# Patient Record
Sex: FEMALE | Race: WHITE | Employment: OTHER | URBAN - METROPOLITAN AREA
[De-identification: names, ages, dates, MRNs, and addresses within clinical notes are randomized per-mention and may not be internally consistent; named-entity substitution may affect disease eponyms.]

---

## 2018-08-01 ENCOUNTER — HOSPITAL ENCOUNTER (OUTPATIENT)
Dept: GENERAL RADIOLOGY | Age: 78
Discharge: HOME | End: 2018-08-03
Payer: MEDICARE

## 2018-08-01 DIAGNOSIS — Z12.31 VISIT FOR SCREENING MAMMOGRAM: ICD-10-CM

## 2018-08-01 PROCEDURE — 77063 BREAST TOMOSYNTHESIS BI: CPT

## 2018-10-09 ENCOUNTER — HOSPITAL ENCOUNTER (OUTPATIENT)
Dept: SLEEP CENTER | Age: 78
Discharge: HOME | End: 2018-10-09
Payer: MEDICARE

## 2018-10-09 PROCEDURE — G0399 HOME SLEEP TEST/TYPE 3 PORTA: HCPCS

## 2018-11-13 ENCOUNTER — HOSPITAL ENCOUNTER (OUTPATIENT)
Dept: SLEEP CENTER | Age: 78
Discharge: HOME | End: 2018-11-13
Payer: MEDICARE

## 2018-11-13 DIAGNOSIS — E13.8 DIABETES MELLITUS OF OTHER TYPE WITH COMPLICATION, UNSPECIFIED WHETHER LONG TERM INSULIN USE: ICD-10-CM

## 2018-11-13 DIAGNOSIS — R06.89 GASPING FOR BREATH: ICD-10-CM

## 2018-11-13 DIAGNOSIS — G47.33 OSA (OBSTRUCTIVE SLEEP APNEA): Primary | ICD-10-CM

## 2018-11-13 DIAGNOSIS — R06.83 SNORES: ICD-10-CM

## 2018-11-13 DIAGNOSIS — G47.9 RESTLESS SLEEPER: ICD-10-CM

## 2018-11-13 DIAGNOSIS — I10 HYPERTENSION, UNSPECIFIED TYPE: ICD-10-CM

## 2018-11-13 PROCEDURE — 95811 POLYSOM 6/>YRS CPAP 4/> PARM: CPT

## 2018-11-14 VITALS
BODY MASS INDEX: 32.43 KG/M2 | HEIGHT: 63 IN | OXYGEN SATURATION: 92 % | HEART RATE: 82 BPM | SYSTOLIC BLOOD PRESSURE: 137 MMHG | WEIGHT: 183 LBS | DIASTOLIC BLOOD PRESSURE: 84 MMHG

## 2019-10-17 ENCOUNTER — HOSPITAL ENCOUNTER (OUTPATIENT)
Dept: GENERAL RADIOLOGY | Age: 79
Discharge: HOME | End: 2019-10-19
Payer: MEDICARE

## 2019-10-17 DIAGNOSIS — Z12.31 VISIT FOR SCREENING MAMMOGRAM: ICD-10-CM

## 2019-10-17 PROCEDURE — 77063 BREAST TOMOSYNTHESIS BI: CPT

## 2020-02-01 PROBLEM — E78.5 HLD (HYPERLIPIDEMIA): Status: ACTIVE | Noted: 2020-02-01

## 2020-02-01 PROBLEM — G47.33 OSA (OBSTRUCTIVE SLEEP APNEA): Status: ACTIVE | Noted: 2020-02-01

## 2020-02-01 PROBLEM — I10 HTN (HYPERTENSION): Status: ACTIVE | Noted: 2020-02-01

## 2020-02-01 PROBLEM — E11.9 TYPE 2 DIABETES MELLITUS WITHOUT COMPLICATION, WITHOUT LONG-TERM CURRENT USE OF INSULIN (HCC): Status: ACTIVE | Noted: 2020-02-01

## 2020-02-03 ENCOUNTER — OFFICE VISIT (OUTPATIENT)
Dept: CARDIOLOGY CLINIC | Age: 80
End: 2020-02-03
Payer: MEDICARE

## 2020-02-03 VITALS
RESPIRATION RATE: 18 BRPM | SYSTOLIC BLOOD PRESSURE: 130 MMHG | WEIGHT: 160 LBS | DIASTOLIC BLOOD PRESSURE: 68 MMHG | HEART RATE: 72 BPM | BODY MASS INDEX: 28.35 KG/M2 | HEIGHT: 63 IN

## 2020-02-03 PROCEDURE — 4040F PNEUMOC VAC/ADMIN/RCVD: CPT | Performed by: INTERNAL MEDICINE

## 2020-02-03 PROCEDURE — G8400 PT W/DXA NO RESULTS DOC: HCPCS | Performed by: INTERNAL MEDICINE

## 2020-02-03 PROCEDURE — G8484 FLU IMMUNIZE NO ADMIN: HCPCS | Performed by: INTERNAL MEDICINE

## 2020-02-03 PROCEDURE — G8417 CALC BMI ABV UP PARAM F/U: HCPCS | Performed by: INTERNAL MEDICINE

## 2020-02-03 PROCEDURE — G8427 DOCREV CUR MEDS BY ELIG CLIN: HCPCS | Performed by: INTERNAL MEDICINE

## 2020-02-03 PROCEDURE — 1036F TOBACCO NON-USER: CPT | Performed by: INTERNAL MEDICINE

## 2020-02-03 PROCEDURE — 1123F ACP DISCUSS/DSCN MKR DOCD: CPT | Performed by: INTERNAL MEDICINE

## 2020-02-03 PROCEDURE — 1090F PRES/ABSN URINE INCON ASSESS: CPT | Performed by: INTERNAL MEDICINE

## 2020-02-03 PROCEDURE — 93000 ELECTROCARDIOGRAM COMPLETE: CPT | Performed by: INTERNAL MEDICINE

## 2020-02-03 PROCEDURE — 99204 OFFICE O/P NEW MOD 45 MIN: CPT | Performed by: INTERNAL MEDICINE

## 2020-02-03 RX ORDER — MELOXICAM 15 MG/1
15 TABLET ORAL DAILY
COMMUNITY
End: 2020-05-28

## 2020-02-03 RX ORDER — AMLODIPINE BESYLATE 5 MG/1
5 TABLET ORAL DAILY
COMMUNITY
Start: 2019-12-09

## 2020-02-03 NOTE — PROGRESS NOTES
Chief Complaint   Patient presents with    Heart Problem       Patient Active Problem List    Diagnosis Date Noted    HTN (hypertension) 02/01/2020    HLD (hyperlipidemia) 02/01/2020    Type 2 diabetes mellitus without complication, without long-term current use of insulin ([facility] [address] 02/01/2020    KOJO (obstructive sleep apnea) 02/01/2020       Current Outpatient Medications   Medication Sig Dispense Refill    amLODIPine (NORVASC) 5 MG tablet 5 mg daily       meloxicam (MOBIC) 15 MG tablet Take 15 mg by mouth daily      metFORMIN (GLUCOPHAGE) 500 MG tablet Take 500 mg by mouth 2 times daily (with meals)      aspirin 81 MG EC tablet Take 81 mg by mouth every other day      losartan (COZAAR) 100 MG tablet Take 100 mg by mouth every evening      atorvastatin (LIPITOR) 20 MG tablet Take 20 mg by mouth every evening      Omega-3 Fatty Acids (FISH OIL) 1000 MG CAPS Take 2,000 mg by mouth daily      vitamin D (CHOLECALCIFEROL) 1000 UNIT TABS tablet Take 1,000 Units by mouth daily      docusate sodium (COLACE, DULCOLAX) 100 MG CAPS Take 100 mg by mouth daily (Patient not taking: Reported on 2/3/2020) 30 capsule 0    docusate sodium (COLACE) 100 MG capsule Take 1 capsule by mouth daily as needed for Constipation (Patient not taking: Reported on 2/3/2020) 20 capsule 0    psyllium (KONSYL) 28.3 % PACK Take 1 packet by mouth nightly       Current Facility-Administered Medications   Medication Dose Route Frequency Provider Last Rate Last Dose    perflutren lipid microspheres (DEFINITY) injection 1.65 mg  1.5 mL Intravenous ONCE PRN Kylah Archer MD            Allergies   Allergen Reactions    Darvon [Propoxyphene]     Prednisone     Strawberry Extract Hives    Tomato Hives       Vitals:    02/03/20 1148   BP: 130/68   Pulse: 72   Resp: 18   Weight: 160 lb (72.6 kg)   Height: 5' 3\" (1.6 m)       Social History     Socioeconomic History    Marital status:      Spouse name: Not on file    Number of children: Not on file    Years of education: Not on file    Highest education level: Not on file   Occupational History    Not on file   Social Needs    Financial resource strain: Not on file    Food insecurity:     Worry: Not on file     Inability: Not on file    Transportation needs:     Medical: Not on file     Non-medical: Not on file   Tobacco Use    Smoking status: Former Smoker     Packs/day: 1.00     Start date: 1957     Last attempt to quit: 1986     Years since quittin.6    Smokeless tobacco: Never Used   Substance and Sexual Activity    Alcohol use: No    Drug use: Not on file    Sexual activity: Not Currently     Partners: Male   Lifestyle    Physical activity:     Days per week: Not on file     Minutes per session: Not on file    Stress: Not on file   Relationships    Social connections:     Talks on phone: Not on file     Gets together: Not on file     Attends Faith service: Not on file     Active member of club or organization: Not on file     Attends meetings of clubs or organizations: Not on file     Relationship status: Not on file    Intimate partner violence:     Fear of current or ex partner: Not on file     Emotionally abused: Not on file     Physically abused: Not on file     Forced sexual activity: Not on file   Other Topics Concern    Not on file   Social History Narrative    Not on file       Family History   Problem Relation Age of Onset    Heart Attack Father     Heart Attack Sister     Heart Attack Sister     Breast Cancer Sister     Alzheimer's Disease Sister     Heart Attack Brother     Heart Attack Brother          SUBJECTIVE: Stu Koroma presents to the office today for consult - dr Helga Ramirez - for -evaluation of TIAs. No cardiac encounters but does have an abnormal ekg brought to her attention year ago.  .  She complains of visual disturbances and denies   chest pain, chest pressure/discomfort, claudication, dyspnea, exertional chest pressure/discomfort, fatigue, irregular heart beat, lower extremity edema, near-syncope, orthopnea, palpitations, paroxysmal nocturnal dyspnea, syncope, tachypnea and or other neuro symptoms  Very active elderly lady. PCP wants to put her on plavix and she wanted a second opinion. .        Review of Systems:   Heart: as above   Lungs: as above   Eyes: denies changes in vision or discharge. Ears: denies changes in hearing or pain. Nose: denies epistaxis or masses   Throat: denies sore throat or trouble swallowing. Neuro: denies numbness, tingling, tremors. Skin: denies rashes or itching. : denies hematuria, dysuria   GI: denies vomiting, diarrhea   Psych: denies mood changed, anxiety, depression. all others negative. Physical Exam   /68   Pulse 72   Resp 18   Ht 5' 3\" (1.6 m)   Wt 160 lb (72.6 kg)   BMI 28.34 kg/m²   Constitutional: Oriented to person, place, and time. Well-developed and well-nourished. No distress. Head: Normocephalic and atraumatic. Eyes: EOM are normal. Pupils are equal, round, and reactive to light. Neck: Normal range of motion. Neck supple. No hepatojugular reflux and no JVD present. Right  Carotid bruit possible. No tracheal deviation present. No thyromegaly present. Cardiovascular: Normal rate, regular rhythm, normal heart sounds and intact distal pulses. Exam reveals no gallop and no friction rub. No murmur heard. Pulmonary/Chest: Effort normal and breath sounds normal. No respiratory distress. No wheezes. No rales. No tenderness. Abdominal: Soft. Bowel sounds are normal. No distension and no mass. No tenderness. No rebound and no guarding. Musculoskeletal: Normal range of motion. No edema and no tenderness. Neurological: Alert and oriented to person, place, and time. Skin: Skin is warm and dry. No rash noted. Not diaphoretic. No erythema. Psychiatric: Normal mood and affect.  Behavior is normal.     EKG:  normal sinus rhythm, septal MI pattern , unchanged from previous tracings. ASSESSMENT AND PLAN:  Patient Active Problem List   Diagnosis    HTN (hypertension)    HLD (hyperlipidemia)    Type 2 diabetes mellitus without complication, without long-term current use of insulin (HCC)    KOJO (obstructive sleep apnea)     Patient with apparent diagnosis of TIAs in past - no documentation of encounters with neurology or imaging studies in Epic  Also has an abnormal ekg suggesting remote septal MI pattern back to 2016  No cardiac symptoms  Will get BCDs and echo but likely will need neurology input.   I told her that technically I am not a \"stroke doctor\"      [facility]

## 2020-02-10 ENCOUNTER — HOSPITAL ENCOUNTER (OUTPATIENT)
Dept: ULTRASOUND IMAGING | Age: 80
Discharge: HOME | End: 2020-02-12
Payer: MEDICARE

## 2020-02-10 PROCEDURE — 93880 EXTRACRANIAL BILAT STUDY: CPT

## 2020-02-12 ENCOUNTER — OFFICE VISIT (OUTPATIENT)
Dept: NEUROLOGY | Age: 80
End: 2020-02-12
Payer: MEDICARE

## 2020-02-12 VITALS
SYSTOLIC BLOOD PRESSURE: 142 MMHG | DIASTOLIC BLOOD PRESSURE: 67 MMHG | TEMPERATURE: 96.8 F | RESPIRATION RATE: 14 BRPM | WEIGHT: 162 LBS | OXYGEN SATURATION: 98 % | HEIGHT: 63 IN | HEART RATE: 63 BPM | BODY MASS INDEX: 28.7 KG/M2

## 2020-02-12 PROCEDURE — 99204 OFFICE O/P NEW MOD 45 MIN: CPT | Performed by: PSYCHIATRY & NEUROLOGY

## 2020-02-12 PROCEDURE — G8484 FLU IMMUNIZE NO ADMIN: HCPCS | Performed by: PSYCHIATRY & NEUROLOGY

## 2020-02-12 PROCEDURE — G8427 DOCREV CUR MEDS BY ELIG CLIN: HCPCS | Performed by: PSYCHIATRY & NEUROLOGY

## 2020-02-12 PROCEDURE — G8417 CALC BMI ABV UP PARAM F/U: HCPCS | Performed by: PSYCHIATRY & NEUROLOGY

## 2020-02-12 PROCEDURE — 1090F PRES/ABSN URINE INCON ASSESS: CPT | Performed by: PSYCHIATRY & NEUROLOGY

## 2020-02-12 ASSESSMENT — ENCOUNTER SYMPTOMS
TROUBLE SWALLOWING: 0
NAUSEA: 0
SHORTNESS OF BREATH: 0
PHOTOPHOBIA: 1
VOMITING: 0

## 2020-02-12 NOTE — PROGRESS NOTES
"Reason For Visit:   Chief Complaint   Patient presents with     Surgical Followup     DOS 10/10/17 S/P Left Partial Knee Replacement        Primary MD: Eve Chahal      Date of surgery: 10/10/17  Type of surgery: Left partial knee repalcement    Smoker: No      Ht 1.702 m (5' 7\")  Wt 76.2 kg (168 lb)  LMP 09/05/2017 (Approximate)  BMI 26.31 kg/m2      Current Outpatient Prescriptions   Medication     hydrOXYzine (VISTARIL) 25 MG capsule     HYDROmorphone (DILAUDID) 2 MG tablet     acetaminophen (TYLENOL) 325 MG tablet     aspirin EC 81 MG EC tablet     senna-docusate (SENOKOT-S;PERICOLACE) 8.6-50 MG per tablet     PARoxetine (PAXIL) 10 MG tablet     omeprazole (PRILOSEC) 20 MG capsule     traZODone (DESYREL) 50 MG tablet     Probiotic Product (PROBIOTIC DAILY PO)     Current Facility-Administered Medications   Medication     triamcinolone acetonide (KENALOG-40) injection 40 mg        No Known Allergies        Lexy Li LPN  " NEUROLOGY NEW PATIENT NOTE     Date: 2/12/2020  Name: Hubert Bynum  MRN: [id number]  Patient's PCP: Gabriel Dowd MD     Dear, Dr. Gabriel Dowd MD    REASON FOR VISIT/CHIEF COMPLAINT: Multiple complaints including stroke, memory loss and vision problems. HISTORY OF PRESENT ILLNESS: Hubert Bynum is a 78 y.o.  female with a past medical history of diabetes, hypertension, hyperlipidemia who is coming in to establish care for multiple complaints. The patient reports that she has had multiple TIAs and she is losing brain functions. Her complaint started in her 46s with the initial episode of left eye vision loss and intermittent left eye blurred vision vision changes which would come and go. The patient reports that about 8 years ago she had an episode where she had vision loss in her eyes and was having difficulty speaking at that time she had a brain MRI which was normal.  She had another similar episode 2 years ago. The most recent episode was about 6 weeks ago where she was unable to read to [facility] when was the doctor's office, her eyes went blurred lasted for 20 minutes resolve on its own. She takes aspirin 81 mg daily and Lipitor 20 mg daily. Her most recent lab work showed LDL of 22 and hemoglobin A1c of 6.1. Last brain MRI was in 2017 which showed small vessel disease  CT scan of the head in 2018 showed no acute abnormality  Most recent carotid ultrasound February 2020 showed no significant stenosis. She does not have a history of atrial fibrillation. She has history of smoking, smoked for about 30 years, and quit in 1980s. He does complain of ongoing problems with memory loss, word finding difficulties reports that she becomes confused. She also reports of episodes of staring off in space.   No other aggravating or relieving factors  No other associated symptoms    I have personally reviewed her paperwork from her primary care physician's office    [facility] HISTORY:   Past Medical History:   Diagnosis Date    Cancer ([facility] [address]     skin    [facility]     Diverticular disease     Hypertension      PAST SURGICAL HISTORY:   Past Surgical History:   Procedure Laterality Date    BREAST SURGERY      Mastecotomy left side    COLONOSCOPY  16    CYSTOSCOPY  16    biopsy with ureteral stents    OTHER SURGICAL HISTORY  5-3-2016    exploratory laparotomy sigmoid resection take down of colovesical fistula     FAMILY MEDICAL HISTORY:   Family History   Problem Relation Age of Onset    Heart Attack Father     Heart Attack Sister     Heart Attack Sister     Breast Cancer Sister     Alzheimer's Disease Sister     Heart Attack Brother     Heart Attack Brother      SOCIAL HISTORY:   Social History     Socioeconomic History    Marital status:      Spouse name: None    Number of children: None    Years of education: None    Highest education level: None   Occupational History    None   Social Needs    Financial resource strain: None    Food insecurity:     Worry: None     Inability: None    Transportation needs:     Medical: None     Non-medical: None   Tobacco Use    Smoking status: Former Smoker     Packs/day: 1.00     Start date: 1957     Last attempt to quit: 1986     Years since quittin.6    Smokeless tobacco: Never Used   Substance and Sexual Activity    Alcohol use: No    Drug use: Never    Sexual activity: Not Currently     Partners: Male   Lifestyle    Physical activity:     Days per week: None     Minutes per session: None    Stress: None   Relationships    Social connections:     Talks on phone: None     Gets together: None     Attends Muslim service: None     Active member of club or organization: None     Attends meetings of clubs or organizations: None     Relationship status: None    Intimate partner violence:     Fear of current or ex partner: None     Emotionally abused: None     Physically abused: Negative for neck pain and neck stiffness. Skin: Negative for rash. Allergic/Immunologic: Negative for food allergies. Neurological: Negative for dizziness, tremors, seizures, syncope, speech difficulty, weakness, light-headedness, numbness and headaches. Hematological: Negative for adenopathy. Psychiatric/Behavioral: Negative for agitation, behavioral problems and sleep disturbance. PHYSICAL EXAM:   BP (!) 142/67   Pulse 63   Temp 96.8 °F (36 °C)   Resp 14   Ht 5' 3\" (1.6 m)   Wt 162 lb (73.5 kg)   SpO2 98%   BMI 28.70 kg/m²   GENERAL APPEARANCE: Alert, well-developed, well-nourished female in no acute distress. HEENT: Normocephalic and atraumatic. PERRL. Oropharynx unremarkable. PULM: Normal respiratory effort. No accessory muscle use. CV: RRR. ABDOMEN: Soft, nontender. EXTREMITIES: No obvious signs of vascular compromise. Pulses present. No cyanosis, clubbing or edema. SKIN: Clear; no rashes, lesions or skin breaks in exposed areas. NEURO:     Neurological examination     MENTAL STATUS: Patient awake and oriented to time, place, and person. Recent/remote memory normal. Attention span/concentration normal. Speech fluent. Good comprehension, naming, and repetition. Fund of knowledge appropriate for patient's level of education. Affect normal.    CRANIAL NERVES:  CN I: Not tested. CN II: Fundoscopic exam not performed. CN III, IV, VI: Pupils equal, round and reactive to light; extra ocular movements full and intact. CN V: Facial sensation normal.  CN VII: No facial asymmetry. CN VIII:  Hearing grossly normal bilaterally. No pathologic nystagmus or skew deviation. CN IX, X: Palate elevates symmetrically. CN XI: Shoulder shrug and chin rotation equal with intact strength. CN XII: Tongue protrusion midline. MOTOR: Normal bulk. Tone normal and symmetric throughout. Strength 5/5 throughout.       ABNORMAL MOVEMENTS/TREMORS: No     REFLEXES: DTRs 2+; normal and symmetric found.    MEDICAL DECISION MAKING  ASSESSMENT/PLAN    Tan was seen today for transient ischemic attack. Diagnoses and all orders for this visit:    TIA (transient ischemic attack)  -     MRI Brain WO Contrast; Future    Memory loss  -     MRI Brain WO Contrast; Future    Vision changes  -     MRI Brain WO Contrast; Future  -     EEG; Future  -     MRI Brain WO Contrast; Future  -     MRA Head WO Contrast; Future  -     EEG; Future    · For her TIA she will continue on aspirin 81 daily and Lipitor 20 daily , most recent LDL: 92, hemoglobin A1c: 6.1. She was evaluated by cardiology and has work-up planned. · Risk factor management including hypertension and diabetes as per primary care physician. · MRI brain to look for any stroke as a cause of her symptoms. · MRA head to look for intracranial arterial stenosis. · She had a carotid ultrasound: No significant stenosis  · Check EEG to look out any seizures as a cause of her episodes. Return in about 8 weeks (around 4/8/2020). Today, I personally spent a great deal of time directly face-to-face minutes in direct face-to-face time with the patient, of which greater than 50% was spent in patient education, counseling,about etiology management diagnosis of TIA, memory loss, stroke, stroke prevention strategies, medication use and treatment and the side effects and coordination of care as described above. Patient's current medication list, allergies, problem list and results of all previously ordered testing  were reviewed at today's visit.       MD LINK [facility] Neurology  [address] [place] [address]

## 2020-02-14 ENCOUNTER — TELEPHONE (OUTPATIENT)
Dept: NEUROLOGY | Age: 80
End: 2020-02-14

## 2020-02-18 ENCOUNTER — HOSPITAL ENCOUNTER (OUTPATIENT)
Dept: MRI IMAGING | Age: 80
Discharge: HOME | End: 2020-02-20
Payer: MEDICARE

## 2020-02-18 PROCEDURE — 70551 MRI BRAIN STEM W/O DYE: CPT

## 2020-02-18 PROCEDURE — 70544 MR ANGIOGRAPHY HEAD W/O DYE: CPT

## 2020-02-19 NOTE — RESULT ENCOUNTER NOTE
Please inform the patient that the brain MRI does not show any acute abnormality.   It shows some age-related changes

## 2020-02-20 ENCOUNTER — TELEPHONE (OUTPATIENT)
Dept: NEUROLOGY | Age: 80
End: 2020-02-20

## 2020-02-20 NOTE — TELEPHONE ENCOUNTER
Spoke with patient and explained that the brain MRI does not show any acute abnormality. It shows some age-related changes.  The MRA of head was normal.

## 2020-02-20 NOTE — TELEPHONE ENCOUNTER
----- Message from Antonio Robledo MD sent at 2/19/2020  4:37 PM EST -----  Please inform the patient that the brain MRI does not show any acute abnormality.   It shows some age-related changes

## 2020-02-21 ENCOUNTER — TELEPHONE (OUTPATIENT)
Dept: CARDIOLOGY | Age: 80
End: 2020-02-21

## 2020-02-21 NOTE — TELEPHONE ENCOUNTER
Left message for patient that echo is 2/25/20 at 11:00.   Electronically signed by Alyssa George on 2/21/2020 at 11:34 AM

## 2020-02-25 ENCOUNTER — HOSPITAL ENCOUNTER (OUTPATIENT)
Dept: CARDIOLOGY | Age: 80
Discharge: HOME | End: 2020-02-25
Payer: MEDICARE

## 2020-02-25 LAB
LV EF: 60 %
LVEF MODALITY: NORMAL

## 2020-02-25 PROCEDURE — 93306 TTE W/DOPPLER COMPLETE: CPT | Performed by: PSYCHIATRY & NEUROLOGY

## 2020-02-25 PROCEDURE — 2580000003 HC RX 258: Performed by: INTERNAL MEDICINE

## 2020-02-25 RX ORDER — SODIUM CHLORIDE 0.9 % (FLUSH) 0.9 %
10 SYRINGE (ML) INJECTION PRN
Status: DISCONTINUED | OUTPATIENT
Start: 2020-02-25 | End: 2020-02-26 | Stop reason: HOSPADM

## 2020-02-25 RX ADMIN — SODIUM CHLORIDE, PRESERVATIVE FREE 10 ML: 5 INJECTION INTRAVENOUS at 11:58

## 2020-02-27 ENCOUNTER — TELEPHONE (OUTPATIENT)
Dept: CARDIOLOGY CLINIC | Age: 80
End: 2020-02-27

## 2020-03-03 ENCOUNTER — HOSPITAL ENCOUNTER (OUTPATIENT)
Dept: NEUROLOGY | Age: 80
Discharge: HOME | End: 2020-03-03
Payer: MEDICARE

## 2020-03-03 PROCEDURE — 95812 EEG 41-60 MINUTES: CPT | Performed by: PSYCHIATRY & NEUROLOGY

## 2020-03-03 PROCEDURE — 95819 EEG AWAKE AND ASLEEP: CPT

## 2020-03-03 NOTE — PROCEDURES
EEG REPORT    NAME: Pranav Lewis  : 1940  MRN: [id number]    DATE of EEG: 3/3/2020    Duration of the EE minutes and 13 seconds    CLINICAL INDICATION: This is a 78 y.o. female is being evaluated for memory loss and vision problems    MEDICATIONS:   Current Outpatient Medications:     amLODIPine (NORVASC) 5 MG tablet, 5 mg daily , Disp: , Rfl:     meloxicam (MOBIC) 15 MG tablet, Take 15 mg by mouth daily, Disp: , Rfl:     metFORMIN (GLUCOPHAGE) 500 MG tablet, Take 500 mg by mouth 2 times daily (with meals), Disp: , Rfl:     docusate sodium (COLACE, DULCOLAX) 100 MG CAPS, Take 100 mg by mouth daily (Patient not taking: Reported on 2/3/2020), Disp: 30 capsule, Rfl: 0    docusate sodium (COLACE) 100 MG capsule, Take 1 capsule by mouth daily as needed for Constipation (Patient not taking: Reported on 2/3/2020), Disp: 20 capsule, Rfl: 0    aspirin 81 MG EC tablet, Take 81 mg by mouth every other day, Disp: , Rfl:     losartan (COZAAR) 100 MG tablet, Take 100 mg by mouth every evening, Disp: , Rfl:     atorvastatin (LIPITOR) 20 MG tablet, Take 20 mg by mouth every evening, Disp: , Rfl:     Omega-3 Fatty Acids (FISH OIL) 1000 MG CAPS, Take 2,000 mg by mouth daily, Disp: , Rfl:     vitamin D (CHOLECALCIFEROL) 1000 UNIT TABS tablet, Take 1,000 Units by mouth daily, Disp: , Rfl:     psyllium (KONSYL) 28.3 % PACK, Take 1 packet by mouth nightly, Disp: , Rfl:     Current Facility-Administered Medications:     perflutren lipid microspheres (DEFINITY) injection 1.65 mg, 1.5 mL, Intravenous, ONCE PRN, Sneha Servin MD     EEG DESCRIPTION:  A 16-channel EEG was performed with electrode placement in 10/20 International System. Longitudinal bipolar and referential montages were utilized in analysis. This is a technically adequate study with minor muscle and motion artifacts.       The dominant posterior background rhythm was a low amplitude, poorly modulated, symmetric and synchronous, reactive, 7 Hz,

## 2020-03-17 ENCOUNTER — TELEPHONE (OUTPATIENT)
Dept: NEUROLOGY | Age: 80
End: 2020-03-17

## 2020-03-17 NOTE — TELEPHONE ENCOUNTER
Spoke with patient and informed her that Dr. Brittaney Fraser stated her EEG showed no seizures. It showed slowing which could be related to age.

## 2020-05-28 ENCOUNTER — OFFICE VISIT (OUTPATIENT)
Dept: NEUROLOGY | Age: 80
End: 2020-05-28
Payer: MEDICARE

## 2020-05-28 VITALS
BODY MASS INDEX: 29.41 KG/M2 | HEART RATE: 79 BPM | SYSTOLIC BLOOD PRESSURE: 150 MMHG | OXYGEN SATURATION: 95 % | WEIGHT: 166 LBS | RESPIRATION RATE: 16 BRPM | HEIGHT: 63 IN | DIASTOLIC BLOOD PRESSURE: 70 MMHG | TEMPERATURE: 97.4 F

## 2020-05-28 PROCEDURE — G8427 DOCREV CUR MEDS BY ELIG CLIN: HCPCS | Performed by: PSYCHIATRY & NEUROLOGY

## 2020-05-28 PROCEDURE — 99213 OFFICE O/P EST LOW 20 MIN: CPT | Performed by: PSYCHIATRY & NEUROLOGY

## 2020-05-28 PROCEDURE — 1090F PRES/ABSN URINE INCON ASSESS: CPT | Performed by: PSYCHIATRY & NEUROLOGY

## 2020-05-28 PROCEDURE — G8417 CALC BMI ABV UP PARAM F/U: HCPCS | Performed by: PSYCHIATRY & NEUROLOGY

## 2020-05-28 RX ORDER — CITALOPRAM 10 MG/1
10 TABLET ORAL DAILY
Qty: 30 TABLET | Refills: 0 | Status: SHIPPED
Start: 2020-05-28 | End: 2020-07-27

## 2020-05-28 ASSESSMENT — ENCOUNTER SYMPTOMS
VOMITING: 0
SHORTNESS OF BREATH: 0
NAUSEA: 0
TROUBLE SWALLOWING: 0
PHOTOPHOBIA: 1

## 2020-05-28 NOTE — PROGRESS NOTES
Attends Taoism service: None     Active member of club or organization: None     Attends meetings of clubs or organizations: None     Relationship status: None    Intimate partner violence     Fear of current or ex partner: None     Emotionally abused: None     Physically abused: None     Forced sexual activity: None   Other Topics Concern    None   Social History Narrative    None     Allergy:   Allergies   Allergen Reactions    Darvon [Propoxyphene]     Prednisone Other (See Comments)     Makes her hyper     Strawberry Extract Hives    Tomato Hives     MEDS:   Current Outpatient Medications:     citalopram (CELEXA) 10 MG tablet, Take 1 tablet by mouth daily, Disp: 30 tablet, Rfl: 0    amLODIPine (NORVASC) 5 MG tablet, 5 mg daily , Disp: , Rfl:     metFORMIN (GLUCOPHAGE) 500 MG tablet, Take 500 mg by mouth 2 times daily (with meals), Disp: , Rfl:     aspirin 81 MG EC tablet, Take 81 mg by mouth every other day, Disp: , Rfl:     losartan (COZAAR) 100 MG tablet, Take 100 mg by mouth every evening, Disp: , Rfl:     atorvastatin (LIPITOR) 20 MG tablet, Take 20 mg by mouth every evening, Disp: , Rfl:     Omega-3 Fatty Acids (FISH OIL) 1000 MG CAPS, Take 2,000 mg by mouth daily, Disp: , Rfl:     vitamin D (CHOLECALCIFEROL) 1000 UNIT TABS tablet, Take 1,000 Units by mouth daily, Disp: , Rfl:     docusate sodium (COLACE, DULCOLAX) 100 MG CAPS, Take 100 mg by mouth daily (Patient not taking: Reported on 2/3/2020), Disp: 30 capsule, Rfl: 0    docusate sodium (COLACE) 100 MG capsule, Take 1 capsule by mouth daily as needed for Constipation (Patient not taking: Reported on 2/3/2020), Disp: 20 capsule, Rfl: 0    psyllium (KONSYL) 28.3 % PACK, Take 1 packet by mouth nightly, Disp: , Rfl:     REVIEW OF SYSTEMS  Review of Systems   Constitutional: Negative for appetite change, fatigue and unexpected weight change. HENT: Negative for drooling, hearing loss, tinnitus and trouble swallowing.     Eyes: Positive for photophobia. Negative for visual disturbance. Respiratory: Negative for shortness of breath. Cardiovascular: Negative for palpitations. Gastrointestinal: Negative for nausea and vomiting. Endocrine: Negative for polyuria. Genitourinary: Negative for flank pain. Musculoskeletal: Negative for neck pain and neck stiffness. Skin: Negative for rash. Allergic/Immunologic: Negative for food allergies. Neurological: Negative for dizziness, tremors, seizures, syncope, speech difficulty, weakness, light-headedness, numbness and headaches. Hematological: Negative for adenopathy. Psychiatric/Behavioral: Negative for agitation, behavioral problems and sleep disturbance. PHYSICAL EXAM:   BP (!) 150/70   Pulse 79   Temp 97.4 °F (36.3 °C) (Oral)   Resp 16   Ht 5' 3\" (1.6 m)   Wt 166 lb (75.3 kg)   SpO2 95%   BMI 29.41 kg/m²   GENERAL APPEARANCE: Alert, well-developed, well-nourished female in no acute distress. HEENT: Normocephalic and atraumatic. PERRL. Oropharynx unremarkable. PULM: Normal respiratory effort. No accessory muscle use. CV: RRR. ABDOMEN: Soft, nontender. EXTREMITIES: No obvious signs of vascular compromise. Pulses present. No cyanosis, clubbing or edema. SKIN: Clear; no rashes, lesions or skin breaks in exposed areas. NEURO:     Neurological examination     MENTAL STATUS: Patient awake and oriented to time, place, and person. Recent/remote memory normal. Attention span/concentration normal. Speech fluent. Good comprehension, naming, and repetition. Fund of knowledge appropriate for patient's level of education. Affect normal.    CRANIAL NERVES:  CN I: Not tested. CN II: Fundoscopic exam not performed. CN III, IV, VI: Pupils equal, round and reactive to light; extra ocular movements full and intact. CN V: Facial sensation normal.  CN VII: No facial asymmetry. CN VIII:  Hearing grossly normal bilaterally. No pathologic nystagmus or skew deviation.   CN IX, X: Palate elevates symmetrically. CN XI: Shoulder shrug and chin rotation equal with intact strength. CN XII: Tongue protrusion midline. MOTOR: Normal bulk. Tone normal and symmetric throughout. Strength 5/5 throughout. ABNORMAL MOVEMENTS/TREMORS: No     REFLEXES: DTRs 2+; normal and symmetric throughout. Plantar response downgoing. SENSATION: Sensation grossly intact to fine touch, pain/temperature, vibration and position. COORDINATION: Finger-to-nose and heel to shin normal for age and symmetric. Finger tapping and alternating movements normal.    STATION: Negative Romberg. GAIT:  Normal heel, toe and tandem; no ataxia. DIAGNOSTIC TESTS:     I have personally reviewed the most recent lab results:    Sodium   Date Value Ref Range Status   06/30/2017 141 132 - 146 mmol/L Final   05/09/2016 137 132 - 146 mmol/L Final   05/08/2016 138 132 - 146 mmol/L Final     Potassium   Date Value Ref Range Status   06/30/2017 4.5 3.5 - 5.0 mmol/L Final   05/09/2016 3.9 3.5 - 5.0 mmol/L Final   05/08/2016 3.5 3.5 - 5.0 mmol/L Final     Chloride   Date Value Ref Range Status   06/30/2017 103 98 - 107 mmol/L Final   05/09/2016 103 98 - 107 mmol/L Final   05/08/2016 105 98 - 107 mmol/L Final     CO2   Date Value Ref Range Status   06/30/2017 26 22 - 29 mmol/L Final   05/09/2016 25 22 - 29 mmol/L Final   05/08/2016 25 22 - 29 mmol/L Final     BUN   Date Value Ref Range Status   06/30/2017 17 8 - 23 mg/dL Final   05/09/2016 9 8 - 23 mg/dL Final   05/08/2016 8 8 - 23 mg/dL Final     CREATININE   Date Value Ref Range Status   06/30/2017 0.7 0.5 - 1.0 mg/dL Final   05/09/2016 0.7 0.5 - 1.0 mg/dL Final   05/08/2016 0.7 0.5 - 1.0 mg/dL Final     GFR Non-   Date Value Ref Range Status   06/30/2017 >60 >=60 mL/min/1.73 Final     Comment:     Chronic Kidney Disease: less than 60 ml/min/1.73 sq.m. Kidney Failure: less than 15 ml/min/1.73 sq.m. Results valid for patients 18 years and older.

## 2020-06-22 ENCOUNTER — TELEPHONE (OUTPATIENT)
Dept: NEUROLOGY | Age: 80
End: 2020-06-22

## 2020-06-24 ENCOUNTER — OFFICE VISIT (OUTPATIENT)
Dept: NEUROLOGY | Age: 80
End: 2020-06-24
Payer: MEDICARE

## 2020-06-24 VITALS
TEMPERATURE: 97.7 F | WEIGHT: 166 LBS | SYSTOLIC BLOOD PRESSURE: 157 MMHG | OXYGEN SATURATION: 96 % | BODY MASS INDEX: 29.41 KG/M2 | HEIGHT: 63 IN | HEART RATE: 72 BPM | DIASTOLIC BLOOD PRESSURE: 72 MMHG | RESPIRATION RATE: 16 BRPM

## 2020-06-24 PROCEDURE — 1123F ACP DISCUSS/DSCN MKR DOCD: CPT | Performed by: PSYCHIATRY & NEUROLOGY

## 2020-06-24 PROCEDURE — G8427 DOCREV CUR MEDS BY ELIG CLIN: HCPCS | Performed by: PSYCHIATRY & NEUROLOGY

## 2020-06-24 PROCEDURE — G8417 CALC BMI ABV UP PARAM F/U: HCPCS | Performed by: PSYCHIATRY & NEUROLOGY

## 2020-06-24 PROCEDURE — G8400 PT W/DXA NO RESULTS DOC: HCPCS | Performed by: PSYCHIATRY & NEUROLOGY

## 2020-06-24 PROCEDURE — 99214 OFFICE O/P EST MOD 30 MIN: CPT | Performed by: PSYCHIATRY & NEUROLOGY

## 2020-06-24 PROCEDURE — 1036F TOBACCO NON-USER: CPT | Performed by: PSYCHIATRY & NEUROLOGY

## 2020-06-24 PROCEDURE — 4040F PNEUMOC VAC/ADMIN/RCVD: CPT | Performed by: PSYCHIATRY & NEUROLOGY

## 2020-06-24 PROCEDURE — 1090F PRES/ABSN URINE INCON ASSESS: CPT | Performed by: PSYCHIATRY & NEUROLOGY

## 2020-06-24 RX ORDER — CITALOPRAM 20 MG/1
20 TABLET ORAL DAILY
Qty: 30 TABLET | Refills: 0 | Status: SHIPPED
Start: 2020-06-24 | End: 2020-07-27 | Stop reason: SDUPTHER

## 2020-06-24 RX ORDER — CROMOLYN SODIUM 5.2 MG
AEROSOL, SPRAY WITH PUMP (ML) NASAL
COMMUNITY
Start: 2020-06-03 | End: 2022-09-06

## 2020-06-24 ASSESSMENT — ENCOUNTER SYMPTOMS
PHOTOPHOBIA: 1
NAUSEA: 0
SHORTNESS OF BREATH: 0
VOMITING: 0
TROUBLE SWALLOWING: 0

## 2020-06-24 NOTE — PROGRESS NOTES
years, and quit in . Ongoing problems with memory loss, word finding difficulties reports that she becomes confused. She also reports of episodes of staring off in space.     PAST MEDICAL HISTORY:   Past Medical History:   Diagnosis Date    Cancer ([facility]     skin    Diabetes mellitus ([facility]     Diverticular disease     Hypertension      PAST SURGICAL HISTORY:   Past Surgical History:   Procedure Laterality Date    BREAST SURGERY      Mastecotomy left side    COLONOSCOPY  16    CYSTOSCOPY  16    biopsy with ureteral stents    OTHER SURGICAL HISTORY  5-3-2016    exploratory laparotomy sigmoid resection take down of colovesical fistula     FAMILY MEDICAL HISTORY:   Family History   Problem Relation Age of Onset    Heart Attack Father     Heart Attack Sister     Heart Attack Sister     Breast Cancer Sister     Alzheimer's Disease Sister     Heart Attack Brother     Heart Attack Brother      SOCIAL HISTORY:   Social History     Socioeconomic History    Marital status:      Spouse name: None    Number of children: None    Years of education: None    Highest education level: None   Occupational History    None   Social Needs    Financial resource strain: None    Food insecurity     Worry: None     Inability: None    Transportation needs     Medical: None     Non-medical: None   Tobacco Use    Smoking status: Former Smoker     Packs/day: 1.00     Start date: 1957     Last attempt to quit: 1986     Years since quittin.0    Smokeless tobacco: Never Used   Substance and Sexual Activity    Alcohol use: No    Drug use: Never    Sexual activity: Not Currently     Partners: Male   Lifestyle    Physical activity     Days per week: None     Minutes per session: None    Stress: None   Relationships    Social connections     Talks on phone: None     Gets together: None     Attends Gnosticist service: None     Active member of club or organization: None     Attends asymmetry. CN VIII:  Hearing grossly normal bilaterally. No pathologic nystagmus or skew deviation. CN IX, X: Palate elevates symmetrically. CN XI: Shoulder shrug and chin rotation equal with intact strength. CN XII: Tongue protrusion midline. MOTOR: Normal bulk. Tone normal and symmetric throughout. Strength 5/5 throughout. ABNORMAL MOVEMENTS/TREMORS: No     REFLEXES: DTRs 2+; normal and symmetric throughout. Plantar response downgoing. SENSATION: Sensation grossly intact to fine touch, pain/temperature, vibration and position. COORDINATION: Finger-to-nose and heel to shin normal for age and symmetric. Finger tapping and alternating movements normal.    STATION: Negative Romberg. GAIT:  Normal heel, toe and tandem; no ataxia. DIAGNOSTIC TESTS:     I have personally reviewed the most recent lab results:    Sodium   Date Value Ref Range Status   06/30/2017 141 132 - 146 mmol/L Final   05/09/2016 137 132 - 146 mmol/L Final   05/08/2016 138 132 - 146 mmol/L Final     Potassium   Date Value Ref Range Status   06/30/2017 4.5 3.5 - 5.0 mmol/L Final   05/09/2016 3.9 3.5 - 5.0 mmol/L Final   05/08/2016 3.5 3.5 - 5.0 mmol/L Final     Chloride   Date Value Ref Range Status   06/30/2017 103 98 - 107 mmol/L Final   05/09/2016 103 98 - 107 mmol/L Final   05/08/2016 105 98 - 107 mmol/L Final     CO2   Date Value Ref Range Status   06/30/2017 26 22 - 29 mmol/L Final   05/09/2016 25 22 - 29 mmol/L Final   05/08/2016 25 22 - 29 mmol/L Final     BUN   Date Value Ref Range Status   06/30/2017 17 8 - 23 mg/dL Final   05/09/2016 9 8 - 23 mg/dL Final   05/08/2016 8 8 - 23 mg/dL Final     CREATININE   Date Value Ref Range Status   06/30/2017 0.7 0.5 - 1.0 mg/dL Final   05/09/2016 0.7 0.5 - 1.0 mg/dL Final   05/08/2016 0.7 0.5 - 1.0 mg/dL Final     GFR Non-   Date Value Ref Range Status   06/30/2017 >60 >=60 mL/min/1.73 Final     Comment:     Chronic Kidney Disease: less than 60 ml/min/1.73 sq.m.

## 2020-07-09 ENCOUNTER — TELEPHONE (OUTPATIENT)
Dept: NEUROLOGY | Age: 80
End: 2020-07-09

## 2020-07-09 LAB
TSH SERPL DL<=0.05 MIU/L-ACNC: 3.43 UIU/ML
VITAMIN B-12: 151

## 2020-07-09 NOTE — TELEPHONE ENCOUNTER
----- Message from London Ulrich MD sent at 7/9/2020 12:40 PM EDT -----  Please inform the patient that her vitamin B12 levels are low. She should follow-up with her primary care physician to get vitamin B12 shots. In the meantime she should start over the counter vitamin B12.   TSH levels are normal.

## 2020-07-09 NOTE — RESULT ENCOUNTER NOTE
Please inform the patient that her vitamin B12 levels are low. She should follow-up with her primary care physician to get vitamin B12 shots. In the meantime she should start over the counter vitamin B12.   TSH levels are normal.

## 2020-07-27 RX ORDER — CITALOPRAM 20 MG/1
20 TABLET ORAL DAILY
Qty: 30 TABLET | Refills: 0 | Status: SHIPPED
Start: 2020-07-27 | End: 2020-08-21 | Stop reason: SDUPTHER

## 2020-07-27 RX ORDER — CITALOPRAM 20 MG/1
20 TABLET ORAL DAILY
Qty: 30 TABLET | Refills: 0 | OUTPATIENT
Start: 2020-07-27

## 2020-08-21 RX ORDER — CITALOPRAM 20 MG/1
20 TABLET ORAL DAILY
Qty: 30 TABLET | Refills: 2 | Status: SHIPPED
Start: 2020-08-27 | End: 2020-11-10 | Stop reason: SDUPTHER

## 2020-09-02 ENCOUNTER — OFFICE VISIT (OUTPATIENT)
Dept: NEUROLOGY | Age: 80
End: 2020-09-02
Payer: MEDICARE

## 2020-09-02 VITALS
OXYGEN SATURATION: 96 % | WEIGHT: 160 LBS | DIASTOLIC BLOOD PRESSURE: 66 MMHG | HEART RATE: 60 BPM | BODY MASS INDEX: 28.35 KG/M2 | SYSTOLIC BLOOD PRESSURE: 130 MMHG | RESPIRATION RATE: 16 BRPM | TEMPERATURE: 97.3 F | HEIGHT: 63 IN

## 2020-09-02 PROCEDURE — 4040F PNEUMOC VAC/ADMIN/RCVD: CPT | Performed by: PSYCHIATRY & NEUROLOGY

## 2020-09-02 PROCEDURE — 99214 OFFICE O/P EST MOD 30 MIN: CPT | Performed by: PSYCHIATRY & NEUROLOGY

## 2020-09-02 PROCEDURE — G8417 CALC BMI ABV UP PARAM F/U: HCPCS | Performed by: PSYCHIATRY & NEUROLOGY

## 2020-09-02 PROCEDURE — 1090F PRES/ABSN URINE INCON ASSESS: CPT | Performed by: PSYCHIATRY & NEUROLOGY

## 2020-09-02 PROCEDURE — 1036F TOBACCO NON-USER: CPT | Performed by: PSYCHIATRY & NEUROLOGY

## 2020-09-02 PROCEDURE — G8400 PT W/DXA NO RESULTS DOC: HCPCS | Performed by: PSYCHIATRY & NEUROLOGY

## 2020-09-02 PROCEDURE — G8427 DOCREV CUR MEDS BY ELIG CLIN: HCPCS | Performed by: PSYCHIATRY & NEUROLOGY

## 2020-09-02 PROCEDURE — 1123F ACP DISCUSS/DSCN MKR DOCD: CPT | Performed by: PSYCHIATRY & NEUROLOGY

## 2020-09-02 ASSESSMENT — ENCOUNTER SYMPTOMS
TROUBLE SWALLOWING: 0
SHORTNESS OF BREATH: 0
PHOTOPHOBIA: 0
NAUSEA: 0
VOMITING: 0

## 2020-09-02 NOTE — PROGRESS NOTES
NEUROLOGY FOLLOW UP  NOTE     Date: 9/2/2020  Name: Delfina Garcia  MRN: [id number]  Patient's PCP: Phylicia Ray MD       REASON FOR VISIT/CHIEF COMPLAINT: Episodes of blurred vision. Interval history:     The patient is coming in for follow-up visit. Further episodes of vision loss  Currently on aspirin and Lipitor. On Celexa for anxiety and depression. Reports that it is working very well. A feeling of hopelessness. He currently is trying to find a hobby. Currently enjoys doing her daily chores. MRI brain and EEG and MRA head: No acute abnormality  Vitamin B12: Low, currently on vitamin B12 supplementation. Intermittent confusion has been resolved, word finding difficulties during conversation has improved. No other aggravating or relieving factors, no other associated symptoms. No further episodes of staring off in space. No other associated symptoms    MRI brain, MRA head: No acute abnormality  EEG: Normal        I have personally reviewed her MRI images     I have personally reviewed her EEG waveforms. Disease course     Delfina Garcia is a 78 y.o.  female with a past medical history of diabetes, hypertension, hyperlipidemia     Her complaint started in her 46s with the initial episode of left eye vision loss and intermittent left eye blurred vision vision changes which would come and go. The patient reports that about 8 years ago she had an episode where she had vision loss in her eyes and was having difficulty speaking at that time she had a brain MRI which was normal.  She had another similar episode 2 years ago. The most recent episode was about 3 to 6 months ago where she was unable to read to [facility] when was the doctor's office, her eyes went blurred lasted for 20 minutes resolve on its own. She takes aspirin 81 mg daily and Lipitor 20 mg daily. Her most recent lab work showed LDL of 22 and hemoglobin A1c of 6.1.   Most recent carotid ultrasound February 2020 showed shortness of breath. Cardiovascular: Negative for palpitations. Gastrointestinal: Negative for nausea and vomiting. Endocrine: Negative for polyuria. Genitourinary: Negative for flank pain. Musculoskeletal: Negative for neck pain and neck stiffness. Skin: Negative for rash. Allergic/Immunologic: Negative for food allergies. Neurological: Negative for dizziness, tremors, seizures, syncope, speech difficulty, weakness, light-headedness, numbness and headaches. Hematological: Negative for adenopathy. Psychiatric/Behavioral: Negative for agitation, behavioral problems and sleep disturbance. PHYSICAL EXAM:   /66   Pulse 60   Temp 97.3 °F (36.3 °C) (Temporal)   Resp 16   Ht 5' 3\" (1.6 m)   Wt 160 lb (72.6 kg)   SpO2 96%   BMI 28.34 kg/m²   GENERAL APPEARANCE: Alert, well-developed, well-nourished female in no acute distress. HEENT: Normocephalic and atraumatic. PERRL. Oropharynx unremarkable. PULM: Normal respiratory effort. No accessory muscle use. CV: RRR. ABDOMEN: Soft, nontender. EXTREMITIES: No obvious signs of vascular compromise. Pulses present. No cyanosis, clubbing or edema. SKIN: Clear; no rashes, lesions or skin breaks in exposed areas. NEURO:     Neurological examination     MENTAL STATUS: Patient awake and oriented to time, place, and person. Recent/remote memory normal. Attention span/concentration normal. Speech fluent. Good comprehension, naming, and repetition. Fund of knowledge appropriate for patient's level of education. Affect normal.    CRANIAL NERVES:  CN I: Not tested. CN II: Fundoscopic exam not performed. CN III, IV, VI: Pupils equal, round and reactive to light; extra ocular movements full and intact. CN V: Facial sensation normal.  CN VII: No facial asymmetry. CN VIII:  Hearing grossly normal bilaterally. No pathologic nystagmus or skew deviation. CN IX, X: Palate elevates symmetrically.   CN XI: Shoulder shrug and chin rotation equal with intact strength. CN XII: Tongue protrusion midline. MOTOR: Normal bulk. Tone normal and symmetric throughout. Strength 5/5 throughout. ABNORMAL MOVEMENTS/TREMORS: No     REFLEXES: DTRs 2+; normal and symmetric throughout. Plantar response downgoing. SENSATION: Sensation grossly intact to fine touch, pain/temperature, vibration and position. COORDINATION: Finger-to-nose and heel to shin normal for age and symmetric. Finger tapping and alternating movements normal.    STATION: Negative Romberg. GAIT:  Normal heel, toe and tandem; no ataxia. DIAGNOSTIC TESTS:     I have personally reviewed the most recent lab results:    Sodium   Date Value Ref Range Status   06/30/2017 141 132 - 146 mmol/L Final   05/09/2016 137 132 - 146 mmol/L Final   05/08/2016 138 132 - 146 mmol/L Final     Potassium   Date Value Ref Range Status   06/30/2017 4.5 3.5 - 5.0 mmol/L Final   05/09/2016 3.9 3.5 - 5.0 mmol/L Final   05/08/2016 3.5 3.5 - 5.0 mmol/L Final     Chloride   Date Value Ref Range Status   06/30/2017 103 98 - 107 mmol/L Final   05/09/2016 103 98 - 107 mmol/L Final   05/08/2016 105 98 - 107 mmol/L Final     CO2   Date Value Ref Range Status   06/30/2017 26 22 - 29 mmol/L Final   05/09/2016 25 22 - 29 mmol/L Final   05/08/2016 25 22 - 29 mmol/L Final     BUN   Date Value Ref Range Status   06/30/2017 17 8 - 23 mg/dL Final   05/09/2016 9 8 - 23 mg/dL Final   05/08/2016 8 8 - 23 mg/dL Final     CREATININE   Date Value Ref Range Status   06/30/2017 0.7 0.5 - 1.0 mg/dL Final   05/09/2016 0.7 0.5 - 1.0 mg/dL Final   05/08/2016 0.7 0.5 - 1.0 mg/dL Final     GFR Non-   Date Value Ref Range Status   06/30/2017 >60 >=60 mL/min/1.73 Final     Comment:     Chronic Kidney Disease: less than 60 ml/min/1.73 sq.m. Kidney Failure: less than 15 ml/min/1.73 sq.m. Results valid for patients 18 years and older.      05/09/2016 >60 >=60 mL/min/1.73 Final     Comment:     Chronic Kidney Disease: less No results found for: CHOLTOT, TRIG, HDL  No components found for: HGBA1C  No results found for: PROTEINCSF, GLUCCSF, WBCCSF    Controlled Substance Monitoring:    Acute and Chronic Pain Monitoring:   No flowsheet data found. MEDICAL DECISION MAKING  ASSESSMENT/PLAN    Tan was seen today for transient ischemic attack. Diagnoses and all orders for this visit:    Episodes of blurred vision    Anxiety    Memory loss    TIA    B12 deficiency    · For her TIA she will continue on aspirin 81 daily and Lipitor 20 daily   · MRI brain, MRA head: No acute abnormality  · Carotid ultrasound: No acute abnormality  · EEG: Mild generalized slowing  · Etiology: Vision changes could be due to underlying anxiety versus TIA   · Anxiety and depression has well controlled, continue on Celexa 20 mg daily. · Continue with vitamin B-12 supplementation. · Memory has significantly improved after initiating B12. Follow-up with primary care physician    Today, I personally spent a great deal of time directly face-to-face minutes in direct face-to-face time with the patient, of which greater than 50% was spent in patient education, counseling,about etiology management diagnosis of TIA, memory loss, stroke, stroke prevention strategies, ocular migraine, anxiety medication use and treatment and the side effects of Celexa and coordination of care as described above. Patient's current medication list, allergies, problem list and results of all previously ordered testing  were reviewed at today's visit.       MD LINK [facility] - [facility] Neurology  [address] [facility]

## 2020-11-10 RX ORDER — CITALOPRAM 20 MG/1
20 TABLET ORAL DAILY
Qty: 30 TABLET | Refills: 2 | Status: SHIPPED
Start: 2020-11-10 | End: 2021-02-10 | Stop reason: SDUPTHER

## 2021-01-26 ENCOUNTER — HOSPITAL ENCOUNTER (OUTPATIENT)
Dept: GENERAL RADIOLOGY | Age: 81
Discharge: HOME | End: 2021-01-28
Payer: MEDICARE

## 2021-01-26 ENCOUNTER — HOSPITAL ENCOUNTER (OUTPATIENT)
Age: 81
Discharge: HOME | End: 2021-01-28
Payer: MEDICARE

## 2021-01-26 DIAGNOSIS — R06.02 SHORTNESS OF BREATH: ICD-10-CM

## 2021-01-26 PROCEDURE — 71046 X-RAY EXAM CHEST 2 VIEWS: CPT

## 2021-02-10 RX ORDER — CITALOPRAM 20 MG/1
20 TABLET ORAL DAILY
Qty: 30 TABLET | Refills: 2 | Status: SHIPPED
Start: 2021-02-10 | End: 2021-05-18 | Stop reason: SDUPTHER

## 2021-05-18 RX ORDER — CITALOPRAM 20 MG/1
20 TABLET ORAL DAILY
Qty: 30 TABLET | Refills: 2 | Status: SHIPPED
Start: 2021-05-18 | End: 2021-08-09 | Stop reason: SDUPTHER

## 2021-08-09 RX ORDER — CITALOPRAM 20 MG/1
20 TABLET ORAL DAILY
Qty: 30 TABLET | Refills: 2 | Status: SHIPPED
Start: 2021-08-09 | Stop reason: SDUPTHER

## 2021-11-03 RX ORDER — CITALOPRAM 20 MG/1
20 TABLET ORAL DAILY
Qty: 30 TABLET | Refills: 2 | Status: SHIPPED
Start: 2021-11-03 | End: 2022-01-18 | Stop reason: SDUPTHER

## 2022-01-18 RX ORDER — CITALOPRAM 20 MG/1
20 TABLET ORAL DAILY
Qty: 30 TABLET | Refills: 2 | Status: SHIPPED | OUTPATIENT
Start: 2022-01-18 | End: 2022-04-21 | Stop reason: SDUPTHER

## 2022-04-21 ENCOUNTER — OFFICE VISIT (OUTPATIENT)
Dept: NEUROLOGY | Age: 82
End: 2022-04-21
Payer: MEDICARE

## 2022-04-21 VITALS
BODY MASS INDEX: 30.12 KG/M2 | DIASTOLIC BLOOD PRESSURE: 65 MMHG | SYSTOLIC BLOOD PRESSURE: 150 MMHG | HEIGHT: 63 IN | WEIGHT: 170 LBS | HEART RATE: 68 BPM | TEMPERATURE: 97.3 F | OXYGEN SATURATION: 94 %

## 2022-04-21 DIAGNOSIS — G43.909 MIGRAINE WITHOUT STATUS MIGRAINOSUS, NOT INTRACTABLE, UNSPECIFIED MIGRAINE TYPE: Primary | ICD-10-CM

## 2022-04-21 PROCEDURE — G8417 CALC BMI ABV UP PARAM F/U: HCPCS | Performed by: CLINICAL NURSE SPECIALIST

## 2022-04-21 PROCEDURE — 1090F PRES/ABSN URINE INCON ASSESS: CPT | Performed by: CLINICAL NURSE SPECIALIST

## 2022-04-21 PROCEDURE — 1123F ACP DISCUSS/DSCN MKR DOCD: CPT | Performed by: CLINICAL NURSE SPECIALIST

## 2022-04-21 PROCEDURE — 4040F PNEUMOC VAC/ADMIN/RCVD: CPT | Performed by: CLINICAL NURSE SPECIALIST

## 2022-04-21 PROCEDURE — G8427 DOCREV CUR MEDS BY ELIG CLIN: HCPCS | Performed by: CLINICAL NURSE SPECIALIST

## 2022-04-21 PROCEDURE — G8400 PT W/DXA NO RESULTS DOC: HCPCS | Performed by: CLINICAL NURSE SPECIALIST

## 2022-04-21 PROCEDURE — 99215 OFFICE O/P EST HI 40 MIN: CPT | Performed by: CLINICAL NURSE SPECIALIST

## 2022-04-21 PROCEDURE — 1036F TOBACCO NON-USER: CPT | Performed by: CLINICAL NURSE SPECIALIST

## 2022-04-21 RX ORDER — CITALOPRAM 20 MG/1
20 TABLET ORAL DAILY
Qty: 90 TABLET | Refills: 2 | Status: SHIPPED
Start: 2022-04-21 | End: 2022-09-06 | Stop reason: SDUPTHER

## 2022-04-21 NOTE — PROGRESS NOTES
Sasha Giron is a 78 y.o. right handed female     Patient was referred for abnormal spells in her vision as well as speech and language difficulties with accompanying headache. She previously followed with neurology and obtain MRI of the brain MRAs as well as echo and EEG testing all proved essentially unrevealing. Given that her spells seem to occur with stressful events in her life there was a question of anxiety being the underlying cause and she was started on citalopram.    For a number of years her spells did improve however over the past 6 months she has copious amounts of stressful events in her life including her brother being diagnosed with prostatic cancer her  having a stroke and now her other friend who is considered to her brother to her also being diagnosed with cancer. Over the last 6 months she has had 10 spells all occur very similarly. They start off with flashes in her vision that appear like lightning streaks those last for a few minutes and are accompanied by speech and language difficulties she has no trouble understanding people but she has trouble conveying spoken word. Occasionally she will complain of a headache with the spells and occasionally she will have a headache without any of these spells. She readily admits to a \"dull headache \"today. Growing up she never suffered with headaches until the early 1980s where she would then in her early 45s. She noticed that after change of life she started getting headaches and she started getting these flashes in her vision. Again the used to occur very few and far between however as she has gotten older her spells seem to have increased in frequency. Her brother her sister and her mother all suffer from headaches and migraines.     Again she did have an EEG which was unrevealing    No chest pain or palpitations  No SOB  No vertigo, lightheadedness or loss of consciousness  No falls, tripping or stumbling  No incontinence of bowels or bladder  No itching or bruising appreciated    ROS otherwise negative     Prior to Visit Medications    Medication Sig Taking?  Authorizing Provider   citalopram (CELEXA) 20 MG tablet Take 1 tablet by mouth daily Yes Rolo Navarro MD   amLODIPine (NORVASC) 5 MG tablet 5 mg daily  Yes Historical Provider, MD   metFORMIN (GLUCOPHAGE) 500 MG tablet Take 500 mg by mouth 2 times daily (with meals) Yes Historical Provider, MD   aspirin 81 MG EC tablet Take 81 mg by mouth every other day Yes Historical Provider, MD   losartan (COZAAR) 100 MG tablet Take 100 mg by mouth every evening Yes Historical Provider, MD   atorvastatin (LIPITOR) 20 MG tablet Take 20 mg by mouth every evening Yes Historical Provider, MD   vitamin D (CHOLECALCIFEROL) 1000 UNIT TABS tablet Take 1,000 Units by mouth daily Yes Historical Provider, MD   citalopram (CELEXA) 20 MG tablet Take 1 tablet by mouth daily  Rolo Navarro MD   cromolyn (NASALCROM) 5.2 MG/ACT nasal spray INSTILL 2 SPRAYS INTO EACH NOSTIL TWICE DAILY  Patient not taking: Reported on 4/21/2022  Historical Provider, MD   docusate sodium (COLACE, DULCOLAX) 100 MG CAPS Take 100 mg by mouth daily  Patient not taking: Reported on 2/3/2020  Shahida Black DO   docusate sodium (COLACE) 100 MG capsule Take 1 capsule by mouth daily as needed for Constipation  Patient not taking: Reported on 2/3/2020  Danny Crenshaw MD   Omega-3 Fatty Acids (FISH OIL) 1000 MG CAPS Take 2,000 mg by mouth daily  Patient not taking: Reported on 4/21/2022  Historical Provider, MD   psyllium (KONSYL) 28.3 % PACK Take 1 packet by mouth nightly  Patient not taking: Reported on 4/21/2022  Historical Provider, MD       Allergies as of 04/21/2022 - Fully Reviewed 04/21/2022   Allergen Reaction Noted    Darvon [propoxyphene]  04/29/2016    Prednisone Other (See Comments) 04/29/2016    Strawberry extract Hives 04/29/2016    Tomato Hives 04/29/2016       Objective:     BP (!) 150/65 (Site: Right GFRAA >60 06/30/2017    LABGLOM >60 06/30/2017    GLUCOSE 178 06/30/2017    PROT 8.0 04/29/2016    LABALBU 3.6 04/29/2016    CALCIUM 9.5 06/30/2017    BILITOT 0.2 04/29/2016    ALKPHOS 62 04/29/2016    AST 14 04/29/2016    ALT 12 04/29/2016     B12 -- 151 --- in 2020    MRI brain  No evidence for acute ischemic event. 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 MRA head:   Negative MRA of the [place]. EEG: Normal    Echo with bubble 2020   No significant valvular abnormalities. Normal left ventricle size. Ejection fraction is visually estimated at 60%. Agitated saline injected for shunt evaluation. No evidence of patent foramen ovale. I personally reviewed the patient's lab and imaging studies at this time. Assessment:     Patient with recurrent spells of flashes in her vision mainly occurring in her left eye however has occasionally occurred in the right. The spells are typically followed by language difficulties and occasionally headaches. I suspect migraines and suspect that she began having migraines later in life as opposed to the growing up as the rest of her family did   Her trigger appears to be stressful events in her life    I doubt recurrent TIAs causing the same underlying signs of symptoms  EEG was normal    Does have a history of B12 deficiency    Plan:      At this time we will try her on samples of Nurtec sublingual  She will report back regarding her response    If she does not respond to Nurtec we will consider repeating B12 levels as well as trial of Ubrelvy instead    Report back upon utilization of the [address], APRN - CNS  1:58 PM  4/21/2022

## 2022-07-23 ENCOUNTER — HOSPITAL ENCOUNTER (EMERGENCY)
Age: 82
Discharge: HOME | End: 2022-07-23
Payer: MEDICARE

## 2022-07-23 VITALS
RESPIRATION RATE: 16 BRPM | TEMPERATURE: 97 F | DIASTOLIC BLOOD PRESSURE: 62 MMHG | OXYGEN SATURATION: 96 % | HEART RATE: 65 BPM | SYSTOLIC BLOOD PRESSURE: 160 MMHG

## 2022-07-23 DIAGNOSIS — L08.9 SKIN INFECTION: Primary | ICD-10-CM

## 2022-07-23 PROCEDURE — 99283 EMERGENCY DEPT VISIT LOW MDM: CPT

## 2022-07-23 RX ORDER — CEPHALEXIN 500 MG/1
500 CAPSULE ORAL 3 TIMES DAILY
Qty: 21 CAPSULE | Refills: 0 | Status: SHIPPED | OUTPATIENT
Start: 2022-07-23 | End: 2022-07-30

## 2022-07-23 ASSESSMENT — PAIN - FUNCTIONAL ASSESSMENT: PAIN_FUNCTIONAL_ASSESSMENT: NONE - DENIES PAIN

## 2022-07-23 NOTE — ED PROVIDER NOTES
[facility]    HPI:  7/23/22, Time: 2:05 PM EDT         Arlin Isaacs is a 78 y.o. female presenting to the ED for sore to the medial right lower leg, beginning 2 weeks ago. The complaint has been persistent, moderate in severity, and worsened by nothing. Patient reports she has been using a salve on the wound but still red and slightly painful. She is a diabetic and concerned about possible infection. Afebrile without recent travel or sick contacts. Patient denies all other symptoms at this time. Review of Systems:   A complete review of systems was performed and pertinent positives and negatives are stated within HPI, all other systems reviewed and are negative.          --------------------------------------------- PAST HISTORY ---------------------------------------------  Past Medical History:  has a past medical history of Cancer ([facility] [address] Diverticular disease, and Hypertension. Past Surgical History:  has a past surgical history that includes Breast surgery; Cystoscopy (5/1/16); Colonoscopy (5/2/16); and other surgical history (5-3-2016). Social History:  reports that she quit smoking about 36 years ago. She started smoking about 65 years ago. She smoked an average of 1 pack per day. She has never used smokeless tobacco. She reports that she does not drink alcohol and does not use drugs. Family History: family history includes Alzheimer's Disease in her sister; Breast Cancer in her sister; Heart Attack in her brother, brother, father, sister, and sister. The patients home medications have been reviewed. Allergies: Darvon [propoxyphene], Prednisone, Strawberry extract, and Tomato    -------------------------------------------------- RESULTS -------------------------------------------------  All laboratory and radiology results have been personally reviewed by myself   LABS:  No results found for this visit on 07/23/22.     RADIOLOGY:  Interpreted by Radiologist.  No orders to display       ------------------------- NURSING NOTES AND VITALS REVIEWED ---------------------------   The nursing notes within the ED encounter and vital signs as below have been reviewed. BP (!) 160/62   Pulse 65   Temp 97 °F (36.1 °C) (Infrared)   Resp 16   SpO2 96%   Oxygen Saturation Interpretation: Normal      ---------------------------------------------------PHYSICAL EXAM--------------------------------------      Constitutional/General: Alert and oriented x3, well appearing, non toxic in NAD  Head: Normocephalic and atraumatic  Eyes: PERRL, EOMI  Mouth: Oropharynx clear, handling secretions, no trismus  Neck: Supple, full ROM,   Pulmonary: Lungs clear to auscultation bilaterally, no wheezes, rales, or rhonchi. Not in respiratory distress  Cardiovascular:  Regular rate and rhythm, no murmurs, gallops, or rubs. 2+ distal pulses  Abdomen: Soft, non tender, non distended,   Extremities: Moves all extremities x 4. Warm and well perfused  Skin: warm and dry. Approximately dime-sized wound to the medial right lower leg which is erythematous and slightly tender to palpate. No pretibial edema bilaterally. 2+ dorsalis pedis pulse on the right. Neurologic: GCS 15,  Psych: Normal Affect      ------------------------------ ED COURSE/MEDICAL DECISION MAKING----------------------  Medications - No data to display      ED COURSE:       Medical Decision Making:    Patient is an 78year old female presenting to the ED for skin infection. No s/sx of systemic infection. Will plan on outpatient treatment with antibiotics. Advised to follow up with PCP for recheck and return to the ED with new or worsening symptoms. Patient voiced understanding an dis agreeable to the above treatment plan. Counseling:    The emergency provider has spoken with the patient and discussed todays results, in addition to providing specific details for the plan of care and counseling regarding the diagnosis and

## 2022-09-06 ENCOUNTER — OFFICE VISIT (OUTPATIENT)
Dept: NEUROLOGY | Age: 82
End: 2022-09-06
Payer: MEDICARE

## 2022-09-06 VITALS
BODY MASS INDEX: 30.11 KG/M2 | WEIGHT: 170 LBS | TEMPERATURE: 98 F | HEART RATE: 72 BPM | SYSTOLIC BLOOD PRESSURE: 156 MMHG | OXYGEN SATURATION: 98 % | DIASTOLIC BLOOD PRESSURE: 68 MMHG

## 2022-09-06 DIAGNOSIS — G43.909 MIGRAINE WITHOUT STATUS MIGRAINOSUS, NOT INTRACTABLE, UNSPECIFIED MIGRAINE TYPE: Primary | ICD-10-CM

## 2022-09-06 PROCEDURE — 1036F TOBACCO NON-USER: CPT | Performed by: CLINICAL NURSE SPECIALIST

## 2022-09-06 PROCEDURE — 99214 OFFICE O/P EST MOD 30 MIN: CPT | Performed by: CLINICAL NURSE SPECIALIST

## 2022-09-06 PROCEDURE — 1090F PRES/ABSN URINE INCON ASSESS: CPT | Performed by: CLINICAL NURSE SPECIALIST

## 2022-09-06 PROCEDURE — 1123F ACP DISCUSS/DSCN MKR DOCD: CPT | Performed by: CLINICAL NURSE SPECIALIST

## 2022-09-06 PROCEDURE — G8417 CALC BMI ABV UP PARAM F/U: HCPCS | Performed by: CLINICAL NURSE SPECIALIST

## 2022-09-06 PROCEDURE — G8400 PT W/DXA NO RESULTS DOC: HCPCS | Performed by: CLINICAL NURSE SPECIALIST

## 2022-09-06 PROCEDURE — G8427 DOCREV CUR MEDS BY ELIG CLIN: HCPCS | Performed by: CLINICAL NURSE SPECIALIST

## 2022-09-06 RX ORDER — RIMEGEPANT SULFATE 75 MG/75MG
TABLET, ORALLY DISINTEGRATING ORAL
COMMUNITY

## 2022-09-06 RX ORDER — CITALOPRAM 20 MG/1
20 TABLET ORAL DAILY
Qty: 90 TABLET | Refills: 3 | Status: SHIPPED | OUTPATIENT
Start: 2022-09-06

## 2022-09-06 NOTE — PROGRESS NOTES
John Recinos is a 78 y.o. right handed female     Patient was referred for abnormal spells in her vision as well as speech and language difficulties with accompanying headache. She previously followed with neurology and obtain MRI of the brain MRAs as well as echo and EEG testing all proved essentially unrevealing. Given that her spells seem to occur with stressful events in her life there was a question of anxiety being the underlying cause and she was started on citalopram.    For a number of years her spells did improve however over the past 6 months she has copious amounts of stressful events in her life including her brother being diagnosed with prostatic cancer her  having a stroke and now her other friend who is considered to her brother to her also being diagnosed with cancer. Over the last 6 months she has had 10 spells all occur very similarly. They start off with flashes in her vision that appear like lightning streaks those last for a few minutes and are accompanied by speech and language difficulties she has no trouble understanding people but she has trouble conveying spoken word. Occasionally she will complain of a headache with the spells and occasionally she will have a headache without any of these spells. She responded very well to [facility] samples since last appointment    Growing up she never suffered with headaches until the early 1980s where she would then in her early 45s. She noticed that after change of life she started getting headaches and she started getting these flashes in her vision. Again the used to occur very few and far between however as she has gotten older her spells seem to have increased in frequency. Her brother her sister and her mother all suffer from headaches and migraines.     Again she did have an EEG which was unrevealing    No chest pain or palpitations  No SOB  No vertigo, lightheadedness or loss of consciousness  No falls, tripping or stumbling  No incontinence of bowels or bladder  No itching or bruising appreciated    ROS otherwise negative     Allergies as of 09/06/2022 - Fully Reviewed 09/06/2022   Allergen Reaction Noted    Darvon [propoxyphene]  04/29/2016    Prednisone Other (See Comments) 04/29/2016    Strawberry extract Hives 04/29/2016    Tomato Hives 04/29/2016       Objective:     BP (!) 156/68   Pulse 72   Temp 98 °F (36.7 °C)   Wt 170 lb (77.1 kg)   SpO2 98%   BMI 30.11 kg/m²      General appearance: alert, appears stated age and cooperative  Head: Normocephalic, without obvious abnormality, atraumatic  Extremities: no cyanosis or edema  Pulses: 2+ and symmetric  Skin: no rashes or lesions    Mental Status: Alert, oriented, thought content appropriate    Speech: clear  Language: appropriate    Cranial Nerves:  I: smell    II: visual acuity     II: visual fields Full   II: pupils LEONCIO   III,VII: ptosis None   III,IV,VI: extraocular muscles  EOMI without nystagmus    V: mastication Normal   V: facial light touch sensation  Normal   V,VII: corneal reflex  Present   VII: facial muscle function - upper     VII: facial muscle function - lower Normal   VIII: hearing Normal   IX: soft palate elevation  Normal   IX,X: gag reflex    XI: trapezius strength  5/5   XI: sternocleidomastoid strength 5/5   XI: neck extension strength  5/5   XII: tongue strength  Normal     Motor:  5/5 throughout  Normal bulk and tone    Sensory:  Normal to LT  Vibration normal in the ankles     Coordination:   FN, FFM and JOS symmetrical    Gait:  Normal     DTR:   No reflexes  No Sheriff's     Laboratory/Radiology:     CBC with Differential:    Lab Results   Component Value Date/Time    WBC 8.3 05/09/2016 03:05 AM    RBC 3.51 05/09/2016 03:05 AM    HGB 10.3 05/09/2016 03:05 AM    HCT 31.4 05/09/2016 03:05 AM     05/09/2016 03:05 AM    MCV 89.2 05/09/2016 03:05 AM    MCH 29.2 05/09/2016 03:05 AM    MCHC 32.7 05/09/2016 03:05 AM    RDW 14.4 05/09/2016 03:05 AM    LYMPHOPCT 7 05/04/2016 04:40 AM    MONOPCT 6 05/04/2016 04:40 AM    BASOPCT 0 05/04/2016 04:40 AM    MONOSABS 1.02 05/04/2016 04:40 AM    LYMPHSABS 1.13 05/04/2016 04:40 AM    EOSABS 0.01 05/04/2016 04:40 AM    BASOSABS 0.02 05/04/2016 04:40 AM     CMP:    Lab Results   Component Value Date/Time     06/30/2017 12:05 PM    K 4.5 06/30/2017 12:05 PM     06/30/2017 12:05 PM    CO2 26 06/30/2017 12:05 PM    BUN 17 06/30/2017 12:05 PM    CREATININE 0.7 06/30/2017 12:05 PM    GFRAA >60 06/30/2017 12:05 PM    LABGLOM >60 06/30/2017 12:05 PM    GLUCOSE 178 06/30/2017 12:05 PM    PROT 8.0 04/29/2016 11:00 AM    LABALBU 3.6 04/29/2016 11:00 AM    CALCIUM 9.5 06/30/2017 12:05 PM    BILITOT 0.2 04/29/2016 11:00 AM    ALKPHOS 62 04/29/2016 11:00 AM    AST 14 04/29/2016 11:00 AM    ALT 12 04/29/2016 11:00 AM     B12 -- 151 --- in 2020    MRI brain  No evidence for acute ischemic event. 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 MRA head:   Negative MRA of the [place]. EEG: Normal    Echo with bubble 2020   No significant valvular abnormalities. Normal left ventricle size. Ejection fraction is visually estimated at 60%. Agitated saline injected for shunt evaluation. No evidence of patent foramen ovale. I personally reviewed the patient's lab and imaging studies at this time. Assessment:     Patient with recurrent spells of flashes in her vision mainly occurring in her left eye however has occasionally occurred in the right. The spells are typically followed by language difficulties and occasionally headaches.   I suspect migraines and suspect that she began having migraines later in life as opposed to the growing up as the rest of her family did   Her trigger appears to be stressful events in her life   Responded well to Nurtec    I doubt recurrent TIAs causing the same underlying signs of symptoms  EEG was normal    Does have a history of B12 deficiency    Plan:     Continue Nurtec as needed  Samples once again provided    BRENDON Meeks  4:02 PM  9/6/2022

## 2023-04-20 ENCOUNTER — APPOINTMENT (OUTPATIENT)
Dept: CT IMAGING | Age: 83
End: 2023-04-20
Payer: MEDICARE

## 2023-04-20 ENCOUNTER — APPOINTMENT (OUTPATIENT)
Dept: GENERAL RADIOLOGY | Age: 83
End: 2023-04-20
Payer: MEDICARE

## 2023-04-20 ENCOUNTER — HOSPITAL ENCOUNTER (EMERGENCY)
Age: 83
Discharge: ANOTHER HOSPITAL | End: 2023-04-20
Attending: EMERGENCY MEDICINE
Payer: MEDICARE

## 2023-04-20 ENCOUNTER — HOSPITAL ENCOUNTER (INPATIENT)
Age: 83
LOS: 4 days | Discharge: ANOTHER HOSPITAL | DRG: 305 | End: 2023-04-24
Attending: INTERNAL MEDICINE | Admitting: INTERNAL MEDICINE
Payer: MEDICARE

## 2023-04-20 VITALS
HEART RATE: 61 BPM | OXYGEN SATURATION: 94 % | TEMPERATURE: 97.6 F | DIASTOLIC BLOOD PRESSURE: 58 MMHG | RESPIRATION RATE: 20 BRPM | BODY MASS INDEX: 31.35 KG/M2 | WEIGHT: 177 LBS | SYSTOLIC BLOOD PRESSURE: 167 MMHG

## 2023-04-20 DIAGNOSIS — R42 DIZZINESS: Primary | ICD-10-CM

## 2023-04-20 DIAGNOSIS — I95.1 ORTHOSTATIC HYPOTENSION: ICD-10-CM

## 2023-04-20 DIAGNOSIS — R77.8 ELEVATED TROPONIN: ICD-10-CM

## 2023-04-20 DIAGNOSIS — E04.1 THYROID NODULE: ICD-10-CM

## 2023-04-20 DIAGNOSIS — I65.23 ATHEROSCLEROSIS OF BOTH CAROTID ARTERIES: ICD-10-CM

## 2023-04-20 LAB
ALBUMIN SERPL-MCNC: 4.2 G/DL (ref 3.5–5.2)
ALP SERPL-CCNC: 65 U/L (ref 35–104)
ALT SERPL-CCNC: 10 U/L (ref 0–32)
ANION GAP SERPL CALCULATED.3IONS-SCNC: 11 MMOL/L (ref 7–16)
AST SERPL-CCNC: 15 U/L (ref 0–31)
BACTERIA URNS QL MICRO: NORMAL /HPF
BASOPHILS # BLD: 0.07 E9/L (ref 0–0.2)
BASOPHILS NFR BLD: 1 % (ref 0–2)
BILIRUB SERPL-MCNC: 0.4 MG/DL (ref 0–1.2)
BILIRUB UR QL STRIP: NEGATIVE
BUN SERPL-MCNC: 21 MG/DL (ref 6–23)
CALCIUM SERPL-MCNC: 10 MG/DL (ref 8.6–10.2)
CHLORIDE SERPL-SCNC: 104 MMOL/L (ref 98–107)
CLARITY UR: CLEAR
CO2 SERPL-SCNC: 24 MMOL/L (ref 22–29)
COLOR UR: YELLOW
CREAT SERPL-MCNC: 0.8 MG/DL (ref 0.5–1)
EKG ATRIAL RATE: 59 BPM
EKG P AXIS: 39 DEGREES
EKG P-R INTERVAL: 146 MS
EKG Q-T INTERVAL: 426 MS
EKG QRS DURATION: 124 MS
EKG QTC CALCULATION (BAZETT): 421 MS
EKG R AXIS: 30 DEGREES
EKG T AXIS: 13 DEGREES
EKG VENTRICULAR RATE: 59 BPM
EOSINOPHIL # BLD: 0.24 E9/L (ref 0.05–0.5)
EOSINOPHIL NFR BLD: 3.5 % (ref 0–6)
EPI CELLS #/AREA URNS HPF: NORMAL /HPF
ERYTHROCYTE [DISTWIDTH] IN BLOOD BY AUTOMATED COUNT: 14.6 FL (ref 11.5–15)
GLUCOSE SERPL-MCNC: 109 MG/DL (ref 74–99)
GLUCOSE UR STRIP-MCNC: NEGATIVE MG/DL
HCT VFR BLD AUTO: 42.5 % (ref 34–48)
HGB BLD-MCNC: 13.8 G/DL (ref 11.5–15.5)
HGB UR QL STRIP: NEGATIVE
IMM GRANULOCYTES # BLD: 0.02 E9/L
IMM GRANULOCYTES NFR BLD: 0.3 % (ref 0–5)
KETONES UR STRIP-MCNC: NEGATIVE MG/DL
LEUKOCYTE ESTERASE UR QL STRIP: NEGATIVE
LYMPHOCYTES # BLD: 1.14 E9/L (ref 1.5–4)
LYMPHOCYTES NFR BLD: 16.7 % (ref 20–42)
MCH RBC QN AUTO: 29.6 PG (ref 26–35)
MCHC RBC AUTO-ENTMCNC: 32.5 % (ref 32–34.5)
MCV RBC AUTO: 91 FL (ref 80–99.9)
MONOCYTES # BLD: 0.44 E9/L (ref 0.1–0.95)
MONOCYTES NFR BLD: 6.5 % (ref 2–12)
NEUTROPHILS # BLD: 4.9 E9/L (ref 1.8–7.3)
NEUTS SEG NFR BLD: 72 % (ref 43–80)
NITRITE UR QL STRIP: NEGATIVE
PH UR STRIP: 6 [PH] (ref 5–9)
PLATELET # BLD AUTO: 296 E9/L (ref 130–450)
PMV BLD AUTO: 9.7 FL (ref 7–12)
POTASSIUM SERPL-SCNC: 4.6 MMOL/L (ref 3.5–5)
PROT SERPL-MCNC: 7.2 G/DL (ref 6.4–8.3)
PROT UR STRIP-MCNC: NEGATIVE MG/DL
RBC # BLD AUTO: 4.67 E12/L (ref 3.5–5.5)
RBC #/AREA URNS HPF: NORMAL /HPF (ref 0–2)
SODIUM SERPL-SCNC: 139 MMOL/L (ref 132–146)
SP GR UR STRIP: 1.02 (ref 1–1.03)
TROPONIN, HIGH SENSITIVITY: 66 NG/L (ref 0–9)
TROPONIN, HIGH SENSITIVITY: 67 NG/L (ref 0–9)
UROBILINOGEN UR STRIP-ACNC: 0.2 E.U./DL
WBC # BLD: 6.8 E9/L (ref 4.5–11.5)
WBC #/AREA URNS HPF: NORMAL /HPF (ref 0–5)

## 2023-04-20 PROCEDURE — 2580000003 HC RX 258

## 2023-04-20 PROCEDURE — 1200000000 HC SEMI PRIVATE

## 2023-04-20 PROCEDURE — 2580000003 HC RX 258: Performed by: EMERGENCY MEDICINE

## 2023-04-20 PROCEDURE — 71045 X-RAY EXAM CHEST 1 VIEW: CPT

## 2023-04-20 PROCEDURE — 70496 CT ANGIOGRAPHY HEAD: CPT

## 2023-04-20 PROCEDURE — 84484 ASSAY OF TROPONIN QUANT: CPT

## 2023-04-20 PROCEDURE — 70498 CT ANGIOGRAPHY NECK: CPT

## 2023-04-20 PROCEDURE — 6370000000 HC RX 637 (ALT 250 FOR IP)

## 2023-04-20 PROCEDURE — 36415 COLL VENOUS BLD VENIPUNCTURE: CPT

## 2023-04-20 PROCEDURE — 93010 ELECTROCARDIOGRAM REPORT: CPT | Performed by: INTERNAL MEDICINE

## 2023-04-20 PROCEDURE — 80053 COMPREHEN METABOLIC PANEL: CPT

## 2023-04-20 PROCEDURE — 81001 URINALYSIS AUTO W/SCOPE: CPT

## 2023-04-20 PROCEDURE — 93005 ELECTROCARDIOGRAM TRACING: CPT | Performed by: NURSE PRACTITIONER

## 2023-04-20 PROCEDURE — 99285 EMERGENCY DEPT VISIT HI MDM: CPT

## 2023-04-20 PROCEDURE — 85025 COMPLETE CBC W/AUTO DIFF WBC: CPT

## 2023-04-20 PROCEDURE — 6360000004 HC RX CONTRAST MEDICATION: Performed by: RADIOLOGY

## 2023-04-20 RX ORDER — ENOXAPARIN SODIUM 100 MG/ML
40 INJECTION SUBCUTANEOUS DAILY
Status: DISCONTINUED | OUTPATIENT
Start: 2023-04-21 | End: 2023-04-24 | Stop reason: HOSPADM

## 2023-04-20 RX ORDER — CITALOPRAM 20 MG/1
20 TABLET ORAL DAILY
Status: DISCONTINUED | OUTPATIENT
Start: 2023-04-21 | End: 2023-04-24 | Stop reason: HOSPADM

## 2023-04-20 RX ORDER — ONDANSETRON 4 MG/1
4 TABLET, ORALLY DISINTEGRATING ORAL EVERY 8 HOURS PRN
Status: DISCONTINUED | OUTPATIENT
Start: 2023-04-20 | End: 2023-04-24 | Stop reason: HOSPADM

## 2023-04-20 RX ORDER — SODIUM CHLORIDE 0.9 % (FLUSH) 0.9 %
10 SYRINGE (ML) INJECTION PRN
Status: DISCONTINUED | OUTPATIENT
Start: 2023-04-20 | End: 2023-04-24 | Stop reason: HOSPADM

## 2023-04-20 RX ORDER — POLYETHYLENE GLYCOL 3350 17 G/17G
17 POWDER, FOR SOLUTION ORAL DAILY PRN
Status: DISCONTINUED | OUTPATIENT
Start: 2023-04-20 | End: 2023-04-24 | Stop reason: HOSPADM

## 2023-04-20 RX ORDER — ASPIRIN 81 MG/1
81 TABLET ORAL EVERY OTHER DAY
Status: DISCONTINUED | OUTPATIENT
Start: 2023-04-21 | End: 2023-04-21

## 2023-04-20 RX ORDER — SODIUM CHLORIDE 0.9 % (FLUSH) 0.9 %
5-40 SYRINGE (ML) INJECTION EVERY 12 HOURS SCHEDULED
Status: DISCONTINUED | OUTPATIENT
Start: 2023-04-20 | End: 2023-04-24 | Stop reason: HOSPADM

## 2023-04-20 RX ORDER — ACETAMINOPHEN 325 MG/1
650 TABLET ORAL EVERY 6 HOURS PRN
Status: DISCONTINUED | OUTPATIENT
Start: 2023-04-20 | End: 2023-04-24 | Stop reason: HOSPADM

## 2023-04-20 RX ORDER — HYDRALAZINE HYDROCHLORIDE 20 MG/ML
5 INJECTION INTRAMUSCULAR; INTRAVENOUS EVERY 6 HOURS PRN
Status: DISCONTINUED | OUTPATIENT
Start: 2023-04-20 | End: 2023-04-24 | Stop reason: HOSPADM

## 2023-04-20 RX ORDER — 0.9 % SODIUM CHLORIDE 0.9 %
1000 INTRAVENOUS SOLUTION INTRAVENOUS ONCE
Status: COMPLETED | OUTPATIENT
Start: 2023-04-20 | End: 2023-04-20

## 2023-04-20 RX ORDER — SODIUM CHLORIDE 9 MG/ML
INJECTION, SOLUTION INTRAVENOUS PRN
Status: DISCONTINUED | OUTPATIENT
Start: 2023-04-20 | End: 2023-04-24 | Stop reason: HOSPADM

## 2023-04-20 RX ORDER — VITAMIN B COMPLEX
1000 TABLET ORAL DAILY
Status: DISCONTINUED | OUTPATIENT
Start: 2023-04-21 | End: 2023-04-24 | Stop reason: HOSPADM

## 2023-04-20 RX ORDER — ATORVASTATIN CALCIUM 20 MG/1
20 TABLET, FILM COATED ORAL EVERY EVENING
Status: DISCONTINUED | OUTPATIENT
Start: 2023-04-20 | End: 2023-04-24 | Stop reason: HOSPADM

## 2023-04-20 RX ORDER — LOSARTAN POTASSIUM 50 MG/1
100 TABLET ORAL EVERY EVENING
Status: DISCONTINUED | OUTPATIENT
Start: 2023-04-20 | End: 2023-04-24 | Stop reason: HOSPADM

## 2023-04-20 RX ORDER — AMLODIPINE BESYLATE 2.5 MG/1
2.5 TABLET ORAL DAILY
Status: DISCONTINUED | OUTPATIENT
Start: 2023-04-21 | End: 2023-04-21

## 2023-04-20 RX ORDER — ACETAMINOPHEN 650 MG/1
650 SUPPOSITORY RECTAL EVERY 6 HOURS PRN
Status: DISCONTINUED | OUTPATIENT
Start: 2023-04-20 | End: 2023-04-24 | Stop reason: HOSPADM

## 2023-04-20 RX ORDER — SODIUM CHLORIDE 9 MG/ML
INJECTION, SOLUTION INTRAVENOUS CONTINUOUS
Status: DISCONTINUED | OUTPATIENT
Start: 2023-04-20 | End: 2023-04-23

## 2023-04-20 RX ORDER — ONDANSETRON 2 MG/ML
4 INJECTION INTRAMUSCULAR; INTRAVENOUS EVERY 6 HOURS PRN
Status: DISCONTINUED | OUTPATIENT
Start: 2023-04-20 | End: 2023-04-24 | Stop reason: HOSPADM

## 2023-04-20 RX ADMIN — SODIUM CHLORIDE 1000 ML: 9 INJECTION, SOLUTION INTRAVENOUS at 13:05

## 2023-04-20 RX ADMIN — Medication 10 ML: at 22:15

## 2023-04-20 RX ADMIN — ATORVASTATIN CALCIUM 20 MG: 20 TABLET, FILM COATED ORAL at 22:15

## 2023-04-20 RX ADMIN — SODIUM CHLORIDE: 9 INJECTION, SOLUTION INTRAVENOUS at 22:17

## 2023-04-20 RX ADMIN — IOPAMIDOL 75 ML: 755 INJECTION, SOLUTION INTRAVENOUS at 13:54

## 2023-04-20 RX ADMIN — LOSARTAN POTASSIUM 100 MG: 50 TABLET, FILM COATED ORAL at 22:15

## 2023-04-20 ASSESSMENT — PAIN DESCRIPTION - LOCATION: LOCATION: EAR

## 2023-04-20 ASSESSMENT — PAIN SCALES - GENERAL
PAINLEVEL_OUTOF10: 0
PAINLEVEL_OUTOF10: 1

## 2023-04-20 ASSESSMENT — LIFESTYLE VARIABLES: HOW OFTEN DO YOU HAVE A DRINK CONTAINING ALCOHOL: NEVER

## 2023-04-20 ASSESSMENT — PAIN DESCRIPTION - ORIENTATION: ORIENTATION: RIGHT

## 2023-04-20 ASSESSMENT — PAIN DESCRIPTION - DESCRIPTORS: DESCRIPTORS: SHARP;PRESSURE

## 2023-04-20 NOTE — ED PROVIDER NOTES
Procedure Laterality Date    BREAST SURGERY      Mastecotomy left side    COLONOSCOPY  16    CYSTOSCOPY  16    biopsy with ureteral stents    OTHER SURGICAL HISTORY  5-3-2016    exploratory laparotomy sigmoid resection take down of colovesical fistula       CURRENTMEDICATIONS       Previous Medications    AMLODIPINE (NORVASC) 5 MG TABLET    1 tablet daily    ASPIRIN 81 MG EC TABLET    Take 1 tablet by mouth every other day    ATORVASTATIN (LIPITOR) 20 MG TABLET    Take 1 tablet by mouth every evening    CITALOPRAM (CELEXA) 20 MG TABLET    Take 1 tablet by mouth daily    DOCUSATE SODIUM (COLACE, DULCOLAX) 100 MG CAPS    Take 100 mg by mouth daily    LOSARTAN (COZAAR) 100 MG TABLET    Take 1 tablet by mouth every evening    METFORMIN (GLUCOPHAGE) 500 MG TABLET    Take 1 tablet by mouth 2 times daily (with meals)    RIMEGEPANT SULFATE (NURTEC) 75 MG TBDP    Take by mouth    VITAMIN D (CHOLECALCIFEROL) 1000 UNIT TABS TABLET    Take 1 tablet by mouth daily       ALLERGIES     Darvon [propoxyphene], Prednisone, Strawberry extract, and Tomato    FAMILYHISTORY       Family History   Problem Relation Age of Onset    Heart Attack Father     Heart Attack Sister     Heart Attack Sister     Breast Cancer Sister     Alzheimer's Disease Sister     Heart Attack Brother     Heart Attack Brother         SOCIAL HISTORY       Social History     Tobacco Use    Smoking status: Former     Packs/day: 1.00     Types: Cigarettes     Start date: 1957     Quit date: 1986     Years since quittin.8    Smokeless tobacco: Never   Vaping Use    Vaping Use: Never used   Substance Use Topics    Alcohol use: No    Drug use: Never       SCREENINGS        [place] Coma Scale  Eye Opening: Spontaneous  Best Verbal Response: Oriented  Best Motor Response: Obeys commands  Mary Coma Scale Score: 15                CIWA Assessment  BP: (!) 187/88  Heart Rate: 60           PHYSICAL EXAM  1 or more Elements     ED Triage Vitals   BP

## 2023-04-21 PROBLEM — I44.7 LBBB (LEFT BUNDLE BRANCH BLOCK): Status: ACTIVE | Noted: 2023-04-21

## 2023-04-21 PROBLEM — R79.89 ELEVATED TROPONIN: Status: ACTIVE | Noted: 2023-04-21

## 2023-04-21 PROBLEM — R77.8 ELEVATED TROPONIN: Status: ACTIVE | Noted: 2023-04-21

## 2023-04-21 PROBLEM — R06.09 DOE (DYSPNEA ON EXERTION): Status: ACTIVE | Noted: 2023-04-21

## 2023-04-21 LAB
ALBUMIN SERPL-MCNC: 3.8 G/DL (ref 3.5–5.2)
ALP SERPL-CCNC: 56 U/L (ref 35–104)
ALT SERPL-CCNC: 9 U/L (ref 0–32)
ANION GAP SERPL CALCULATED.3IONS-SCNC: 10 MMOL/L (ref 7–16)
AST SERPL-CCNC: 15 U/L (ref 0–31)
BASOPHILS # BLD: 0.08 E9/L (ref 0–0.2)
BASOPHILS NFR BLD: 1 % (ref 0–2)
BILIRUB SERPL-MCNC: 0.4 MG/DL (ref 0–1.2)
BNP BLD-MCNC: 324 PG/ML (ref 0–450)
BUN SERPL-MCNC: 16 MG/DL (ref 6–23)
CALCIUM SERPL-MCNC: 9.7 MG/DL (ref 8.6–10.2)
CHLORIDE SERPL-SCNC: 107 MMOL/L (ref 98–107)
CHOLESTEROL, FASTING: 149 MG/DL (ref 0–199)
CO2 SERPL-SCNC: 23 MMOL/L (ref 22–29)
CREAT SERPL-MCNC: 0.8 MG/DL (ref 0.5–1)
EOSINOPHIL # BLD: 0.3 E9/L (ref 0.05–0.5)
EOSINOPHIL NFR BLD: 3.8 % (ref 0–6)
ERYTHROCYTE [DISTWIDTH] IN BLOOD BY AUTOMATED COUNT: 14.3 FL (ref 11.5–15)
GLUCOSE SERPL-MCNC: 110 MG/DL (ref 74–99)
HBA1C MFR BLD: 6.2 % (ref 4–5.6)
HCT VFR BLD AUTO: 38.3 % (ref 34–48)
HDLC SERPL-MCNC: 43 MG/DL
HGB BLD-MCNC: 12.3 G/DL (ref 11.5–15.5)
IMM GRANULOCYTES # BLD: 0.03 E9/L
IMM GRANULOCYTES NFR BLD: 0.4 % (ref 0–5)
LDL CHOLESTEROL CALCULATED: 85 MG/DL (ref 0–99)
LV EF: 63 %
LVEF MODALITY: NORMAL
LYMPHOCYTES # BLD: 1.44 E9/L (ref 1.5–4)
LYMPHOCYTES NFR BLD: 18.4 % (ref 20–42)
MAGNESIUM SERPL-MCNC: 1.7 MG/DL (ref 1.6–2.6)
MCH RBC QN AUTO: 29.5 PG (ref 26–35)
MCHC RBC AUTO-ENTMCNC: 32.1 % (ref 32–34.5)
MCV RBC AUTO: 91.8 FL (ref 80–99.9)
MONOCYTES # BLD: 0.76 E9/L (ref 0.1–0.95)
MONOCYTES NFR BLD: 9.7 % (ref 2–12)
NEUTROPHILS # BLD: 5.21 E9/L (ref 1.8–7.3)
NEUTS SEG NFR BLD: 66.7 % (ref 43–80)
PLATELET # BLD AUTO: 275 E9/L (ref 130–450)
PMV BLD AUTO: 10 FL (ref 7–12)
POTASSIUM SERPL-SCNC: 4.1 MMOL/L (ref 3.5–5)
PROT SERPL-MCNC: 6.7 G/DL (ref 6.4–8.3)
RBC # BLD AUTO: 4.17 E12/L (ref 3.5–5.5)
SODIUM SERPL-SCNC: 140 MMOL/L (ref 132–146)
TRIGLYCERIDE, FASTING: 104 MG/DL (ref 0–149)
TSH SERPL-MCNC: 7.54 UIU/ML (ref 0.27–4.2)
VLDLC SERPL CALC-MCNC: 21 MG/DL
WBC # BLD: 7.8 E9/L (ref 4.5–11.5)

## 2023-04-21 PROCEDURE — 6370000000 HC RX 637 (ALT 250 FOR IP)

## 2023-04-21 PROCEDURE — 6360000002 HC RX W HCPCS: Performed by: CLINICAL NURSE SPECIALIST

## 2023-04-21 PROCEDURE — 83735 ASSAY OF MAGNESIUM: CPT

## 2023-04-21 PROCEDURE — 99223 1ST HOSP IP/OBS HIGH 75: CPT | Performed by: INTERNAL MEDICINE

## 2023-04-21 PROCEDURE — 1200000000 HC SEMI PRIVATE

## 2023-04-21 PROCEDURE — 97161 PT EVAL LOW COMPLEX 20 MIN: CPT

## 2023-04-21 PROCEDURE — 36415 COLL VENOUS BLD VENIPUNCTURE: CPT

## 2023-04-21 PROCEDURE — 97530 THERAPEUTIC ACTIVITIES: CPT

## 2023-04-21 PROCEDURE — 2580000003 HC RX 258

## 2023-04-21 PROCEDURE — 84443 ASSAY THYROID STIM HORMONE: CPT

## 2023-04-21 PROCEDURE — 80061 LIPID PANEL: CPT

## 2023-04-21 PROCEDURE — 85025 COMPLETE CBC W/AUTO DIFF WBC: CPT

## 2023-04-21 PROCEDURE — APPSS60 APP SPLIT SHARED TIME 46-60 MINUTES: Performed by: CLINICAL NURSE SPECIALIST

## 2023-04-21 PROCEDURE — 93306 TTE W/DOPPLER COMPLETE: CPT

## 2023-04-21 PROCEDURE — 80053 COMPREHEN METABOLIC PANEL: CPT

## 2023-04-21 PROCEDURE — 97165 OT EVAL LOW COMPLEX 30 MIN: CPT

## 2023-04-21 PROCEDURE — 6360000002 HC RX W HCPCS

## 2023-04-21 PROCEDURE — 83880 ASSAY OF NATRIURETIC PEPTIDE: CPT

## 2023-04-21 PROCEDURE — 83036 HEMOGLOBIN GLYCOSYLATED A1C: CPT

## 2023-04-21 RX ORDER — AMLODIPINE BESYLATE 5 MG/1
5 TABLET ORAL DAILY
Qty: 30 TABLET | Refills: 3 | Status: ON HOLD | OUTPATIENT
Start: 2023-04-21 | End: 2023-04-24 | Stop reason: SDUPTHER

## 2023-04-21 RX ORDER — MAGNESIUM SULFATE IN WATER 40 MG/ML
2000 INJECTION, SOLUTION INTRAVENOUS ONCE
Status: COMPLETED | OUTPATIENT
Start: 2023-04-21 | End: 2023-04-21

## 2023-04-21 RX ORDER — AMLODIPINE BESYLATE 5 MG/1
5 TABLET ORAL DAILY
Status: DISCONTINUED | OUTPATIENT
Start: 2023-04-22 | End: 2023-04-24 | Stop reason: HOSPADM

## 2023-04-21 RX ORDER — LOSARTAN POTASSIUM 100 MG/1
100 TABLET ORAL EVERY EVENING
Qty: 30 TABLET | Refills: 3 | Status: SHIPPED | OUTPATIENT
Start: 2023-04-21

## 2023-04-21 RX ORDER — AMLODIPINE BESYLATE 5 MG/1
5 TABLET ORAL DAILY
Qty: 30 TABLET | Refills: 3 | Status: ON HOLD | OUTPATIENT
Start: 2023-04-22 | End: 2023-04-24 | Stop reason: HOSPADM

## 2023-04-21 RX ADMIN — SODIUM CHLORIDE: 9 INJECTION, SOLUTION INTRAVENOUS at 17:10

## 2023-04-21 RX ADMIN — LOSARTAN POTASSIUM 100 MG: 50 TABLET, FILM COATED ORAL at 22:11

## 2023-04-21 RX ADMIN — METFORMIN HYDROCHLORIDE 500 MG: 500 TABLET ORAL at 17:11

## 2023-04-21 RX ADMIN — Medication 10 ML: at 07:59

## 2023-04-21 RX ADMIN — ENOXAPARIN SODIUM 40 MG: 100 INJECTION SUBCUTANEOUS at 07:59

## 2023-04-21 RX ADMIN — ATORVASTATIN CALCIUM 20 MG: 20 TABLET, FILM COATED ORAL at 22:11

## 2023-04-21 RX ADMIN — ASPIRIN 81 MG: 81 TABLET, COATED ORAL at 07:58

## 2023-04-21 RX ADMIN — METFORMIN HYDROCHLORIDE 500 MG: 500 TABLET ORAL at 07:59

## 2023-04-21 RX ADMIN — CITALOPRAM HYDROBROMIDE 20 MG: 20 TABLET ORAL at 07:58

## 2023-04-21 RX ADMIN — Medication 1000 UNITS: at 07:58

## 2023-04-21 RX ADMIN — AMLODIPINE BESYLATE 2.5 MG: 2.5 TABLET ORAL at 07:58

## 2023-04-21 RX ADMIN — HYDRALAZINE HYDROCHLORIDE 5 MG: 20 INJECTION INTRAMUSCULAR; INTRAVENOUS at 02:15

## 2023-04-21 RX ADMIN — MAGNESIUM SULFATE HEPTAHYDRATE 2000 MG: 40 INJECTION, SOLUTION INTRAVENOUS at 14:56

## 2023-04-21 RX ADMIN — HYDRALAZINE HYDROCHLORIDE 5 MG: 20 INJECTION INTRAMUSCULAR; INTRAVENOUS at 19:01

## 2023-04-22 ENCOUNTER — APPOINTMENT (OUTPATIENT)
Dept: NUCLEAR MEDICINE | Age: 83
DRG: 305 | End: 2023-04-22
Attending: INTERNAL MEDICINE
Payer: MEDICARE

## 2023-04-22 LAB
LV EF: 81 %
LVEF MODALITY: NORMAL

## 2023-04-22 PROCEDURE — 93018 CV STRESS TEST I&R ONLY: CPT | Performed by: INTERNAL MEDICINE

## 2023-04-22 PROCEDURE — A9500 TC99M SESTAMIBI: HCPCS | Performed by: RADIOLOGY

## 2023-04-22 PROCEDURE — 78452 HT MUSCLE IMAGE SPECT MULT: CPT

## 2023-04-22 PROCEDURE — 6370000000 HC RX 637 (ALT 250 FOR IP): Performed by: CLINICAL NURSE SPECIALIST

## 2023-04-22 PROCEDURE — 93017 CV STRESS TEST TRACING ONLY: CPT

## 2023-04-22 PROCEDURE — 6360000002 HC RX W HCPCS

## 2023-04-22 PROCEDURE — 6370000000 HC RX 637 (ALT 250 FOR IP)

## 2023-04-22 PROCEDURE — 3430000000 HC RX DIAGNOSTIC RADIOPHARMACEUTICAL: Performed by: RADIOLOGY

## 2023-04-22 PROCEDURE — 99233 SBSQ HOSP IP/OBS HIGH 50: CPT | Performed by: INTERNAL MEDICINE

## 2023-04-22 PROCEDURE — 1200000000 HC SEMI PRIVATE

## 2023-04-22 PROCEDURE — 6360000002 HC RX W HCPCS: Performed by: CLINICAL NURSE SPECIALIST

## 2023-04-22 PROCEDURE — 78452 HT MUSCLE IMAGE SPECT MULT: CPT | Performed by: INTERNAL MEDICINE

## 2023-04-22 PROCEDURE — 2580000003 HC RX 258

## 2023-04-22 PROCEDURE — 93016 CV STRESS TEST SUPVJ ONLY: CPT | Performed by: INTERNAL MEDICINE

## 2023-04-22 RX ORDER — TETRAKIS(2-METHOXYISOBUTYLISOCYANIDE)COPPER(I) TETRAFLUOROBORATE 1 MG/ML
10 INJECTION, POWDER, LYOPHILIZED, FOR SOLUTION INTRAVENOUS
Status: COMPLETED | OUTPATIENT
Start: 2023-04-22 | End: 2023-04-22

## 2023-04-22 RX ORDER — TETRAKIS(2-METHOXYISOBUTYLISOCYANIDE)COPPER(I) TETRAFLUOROBORATE 1 MG/ML
30 INJECTION, POWDER, LYOPHILIZED, FOR SOLUTION INTRAVENOUS
Status: COMPLETED | OUTPATIENT
Start: 2023-04-22 | End: 2023-04-22

## 2023-04-22 RX ADMIN — CITALOPRAM HYDROBROMIDE 20 MG: 20 TABLET ORAL at 12:05

## 2023-04-22 RX ADMIN — ATORVASTATIN CALCIUM 20 MG: 20 TABLET, FILM COATED ORAL at 22:35

## 2023-04-22 RX ADMIN — SODIUM CHLORIDE: 9 INJECTION, SOLUTION INTRAVENOUS at 06:10

## 2023-04-22 RX ADMIN — Medication 30 MILLICURIE: at 10:30

## 2023-04-22 RX ADMIN — ENOXAPARIN SODIUM 40 MG: 100 INJECTION SUBCUTANEOUS at 15:35

## 2023-04-22 RX ADMIN — LOSARTAN POTASSIUM 100 MG: 50 TABLET, FILM COATED ORAL at 22:35

## 2023-04-22 RX ADMIN — SODIUM CHLORIDE: 9 INJECTION, SOLUTION INTRAVENOUS at 21:54

## 2023-04-22 RX ADMIN — METFORMIN HYDROCHLORIDE 500 MG: 500 TABLET ORAL at 15:36

## 2023-04-22 RX ADMIN — AMLODIPINE BESYLATE 5 MG: 5 TABLET ORAL at 12:05

## 2023-04-22 RX ADMIN — REGADENOSON 0.4 MG: 0.08 INJECTION, SOLUTION INTRAVENOUS at 10:50

## 2023-04-22 RX ADMIN — Medication 10 MILLICURIE: at 08:56

## 2023-04-22 RX ADMIN — HYDRALAZINE HYDROCHLORIDE 5 MG: 20 INJECTION INTRAMUSCULAR; INTRAVENOUS at 06:30

## 2023-04-23 PROCEDURE — 6360000002 HC RX W HCPCS

## 2023-04-23 PROCEDURE — 2580000003 HC RX 258

## 2023-04-23 PROCEDURE — 99233 SBSQ HOSP IP/OBS HIGH 50: CPT | Performed by: INTERNAL MEDICINE

## 2023-04-23 PROCEDURE — 6370000000 HC RX 637 (ALT 250 FOR IP): Performed by: CLINICAL NURSE SPECIALIST

## 2023-04-23 PROCEDURE — 1200000000 HC SEMI PRIVATE

## 2023-04-23 PROCEDURE — 6370000000 HC RX 637 (ALT 250 FOR IP)

## 2023-04-23 PROCEDURE — 6370000000 HC RX 637 (ALT 250 FOR IP): Performed by: INTERNAL MEDICINE

## 2023-04-23 RX ORDER — ASPIRIN 81 MG/1
81 TABLET ORAL DAILY
Status: DISCONTINUED | OUTPATIENT
Start: 2023-04-23 | End: 2023-04-24 | Stop reason: HOSPADM

## 2023-04-23 RX ADMIN — AMLODIPINE BESYLATE 5 MG: 5 TABLET ORAL at 08:15

## 2023-04-23 RX ADMIN — ASPIRIN 81 MG: 81 TABLET, COATED ORAL at 08:16

## 2023-04-23 RX ADMIN — Medication 1000 UNITS: at 08:16

## 2023-04-23 RX ADMIN — ATORVASTATIN CALCIUM 20 MG: 20 TABLET, FILM COATED ORAL at 21:46

## 2023-04-23 RX ADMIN — LOSARTAN POTASSIUM 100 MG: 50 TABLET, FILM COATED ORAL at 21:46

## 2023-04-23 RX ADMIN — CITALOPRAM HYDROBROMIDE 20 MG: 20 TABLET ORAL at 08:16

## 2023-04-23 RX ADMIN — Medication 5 ML: at 21:00

## 2023-04-23 RX ADMIN — ENOXAPARIN SODIUM 40 MG: 100 INJECTION SUBCUTANEOUS at 08:16

## 2023-04-24 ENCOUNTER — HOSPITAL ENCOUNTER (OUTPATIENT)
Age: 83
Discharge: HOME | End: 2023-04-24
Attending: INTERNAL MEDICINE | Admitting: INTERNAL MEDICINE
Payer: MEDICARE

## 2023-04-24 VITALS
HEART RATE: 70 BPM | RESPIRATION RATE: 16 BRPM | DIASTOLIC BLOOD PRESSURE: 49 MMHG | TEMPERATURE: 97.1 F | SYSTOLIC BLOOD PRESSURE: 139 MMHG | OXYGEN SATURATION: 96 %

## 2023-04-24 VITALS
WEIGHT: 187.83 LBS | BODY MASS INDEX: 33.28 KG/M2 | RESPIRATION RATE: 16 BRPM | HEIGHT: 63 IN | HEART RATE: 58 BPM | DIASTOLIC BLOOD PRESSURE: 60 MMHG | TEMPERATURE: 97.8 F | OXYGEN SATURATION: 91 % | SYSTOLIC BLOOD PRESSURE: 142 MMHG

## 2023-04-24 PROBLEM — Z98.890 S/P CARDIAC CATH: Status: ACTIVE | Noted: 2023-04-24

## 2023-04-24 LAB
ABO + RH BLD: NORMAL
ANION GAP SERPL CALCULATED.3IONS-SCNC: 11 MMOL/L (ref 7–16)
BLD GP AB SCN SERPL QL: NORMAL
BUN SERPL-MCNC: 21 MG/DL (ref 6–23)
CALCIUM SERPL-MCNC: 9.5 MG/DL (ref 8.6–10.2)
CHLORIDE SERPL-SCNC: 108 MMOL/L (ref 98–107)
CO2 SERPL-SCNC: 21 MMOL/L (ref 22–29)
CREAT SERPL-MCNC: 0.8 MG/DL (ref 0.5–1)
GLUCOSE SERPL-MCNC: 101 MG/DL (ref 74–99)
POTASSIUM SERPL-SCNC: 4.1 MMOL/L (ref 3.5–5)
SODIUM SERPL-SCNC: 140 MMOL/L (ref 132–146)

## 2023-04-24 PROCEDURE — 2580000003 HC RX 258

## 2023-04-24 PROCEDURE — C1769 GUIDE WIRE: HCPCS

## 2023-04-24 PROCEDURE — 2500000003 HC RX 250 WO HCPCS

## 2023-04-24 PROCEDURE — C1894 INTRO/SHEATH, NON-LASER: HCPCS

## 2023-04-24 PROCEDURE — 6370000000 HC RX 637 (ALT 250 FOR IP): Performed by: INTERNAL MEDICINE

## 2023-04-24 PROCEDURE — 99233 SBSQ HOSP IP/OBS HIGH 50: CPT | Performed by: INTERNAL MEDICINE

## 2023-04-24 PROCEDURE — 36415 COLL VENOUS BLD VENIPUNCTURE: CPT

## 2023-04-24 PROCEDURE — 80048 BASIC METABOLIC PNL TOTAL CA: CPT

## 2023-04-24 PROCEDURE — 86901 BLOOD TYPING SEROLOGIC RH(D): CPT

## 2023-04-24 PROCEDURE — 6370000000 HC RX 637 (ALT 250 FOR IP): Performed by: CLINICAL NURSE SPECIALIST

## 2023-04-24 PROCEDURE — 6360000002 HC RX W HCPCS

## 2023-04-24 PROCEDURE — 2709999900 HC NON-CHARGEABLE SUPPLY

## 2023-04-24 PROCEDURE — 86900 BLOOD TYPING SEROLOGIC ABO: CPT

## 2023-04-24 PROCEDURE — 86850 RBC ANTIBODY SCREEN: CPT

## 2023-04-24 PROCEDURE — 93454 CORONARY ARTERY ANGIO S&I: CPT

## 2023-04-24 RX ORDER — SODIUM CHLORIDE 9 MG/ML
INJECTION, SOLUTION INTRAVENOUS PRN
Status: DISCONTINUED | OUTPATIENT
Start: 2023-04-24 | End: 2023-04-24 | Stop reason: HOSPADM

## 2023-04-24 RX ORDER — METOPROLOL SUCCINATE 25 MG/1
12.5 TABLET, EXTENDED RELEASE ORAL DAILY
Status: DISCONTINUED | OUTPATIENT
Start: 2023-04-24 | End: 2023-04-24 | Stop reason: HOSPADM

## 2023-04-24 RX ORDER — ACETAMINOPHEN 325 MG/1
650 TABLET ORAL EVERY 4 HOURS PRN
Status: DISCONTINUED | OUTPATIENT
Start: 2023-04-24 | End: 2023-04-24 | Stop reason: HOSPADM

## 2023-04-24 RX ORDER — CARVEDILOL 6.25 MG/1
6.25 TABLET ORAL 2 TIMES DAILY
Qty: 60 TABLET | Refills: 3 | Status: SHIPPED | OUTPATIENT
Start: 2023-04-24

## 2023-04-24 RX ORDER — AMLODIPINE BESYLATE 10 MG/1
10 TABLET ORAL DAILY
Qty: 90 TABLET | Refills: 2 | Status: SHIPPED | OUTPATIENT
Start: 2023-04-24

## 2023-04-24 RX ORDER — SODIUM CHLORIDE 0.9 % (FLUSH) 0.9 %
5-40 SYRINGE (ML) INJECTION PRN
Status: DISCONTINUED | OUTPATIENT
Start: 2023-04-24 | End: 2023-04-24 | Stop reason: HOSPADM

## 2023-04-24 RX ORDER — ASPIRIN 81 MG/1
81 TABLET ORAL DAILY
Qty: 90 TABLET | Refills: 3 | Status: SHIPPED | OUTPATIENT
Start: 2023-04-24

## 2023-04-24 RX ORDER — SODIUM CHLORIDE 0.9 % (FLUSH) 0.9 %
5-40 SYRINGE (ML) INJECTION EVERY 12 HOURS SCHEDULED
Status: DISCONTINUED | OUTPATIENT
Start: 2023-04-24 | End: 2023-04-24 | Stop reason: HOSPADM

## 2023-04-24 RX ADMIN — Medication 10 ML: at 09:50

## 2023-04-24 RX ADMIN — ASPIRIN 81 MG: 81 TABLET, COATED ORAL at 06:33

## 2023-04-24 RX ADMIN — AMLODIPINE BESYLATE 5 MG: 5 TABLET ORAL at 06:33

## 2023-04-24 ASSESSMENT — PAIN SCALES - GENERAL
PAINLEVEL_OUTOF10: 0
PAINLEVEL_OUTOF10: 0

## 2023-04-24 NOTE — PROGRESS NOTES
INPATIENT CARDIOLOGY FOLLOW-UP    Name: Kelsea Day    Age: 78 y.o. Date of Admission: 4/20/2023  9:04 PM    Date of Service: 4/24/2023    Primary Cardiologist: Known to Dr. Vernon Orozco    Chief Complaint: Follow-up for dyspnea, new left bundle branch block    Interim History:  Patient had abnormal stress test and awaiting cardiac radiation today. She denies orthopnea or proximal nocturnal dyspnea  She is able to lay down flat completely without any shortness of breath  She has been n.p.o. with normal kidney function hemoglobin ; will arrange for catheterization.       Review of Systems:   Negative except as described above    Problem List:  Patient Active Problem List   Diagnosis    HTN (hypertension)    HLD (hyperlipidemia)    Type 2 diabetes mellitus without complication, without long-term current use of insulin (HCC)    KOJO (obstructive sleep apnea)    Dizziness    LBBB (left bundle branch block)    CUELLAR (dyspnea on exertion)    Elevated troponin       Current Medications:    Current Facility-Administered Medications:     aspirin EC tablet 81 mg, 81 mg, Oral, Daily, Orly Clemente MD, 81 mg at 04/24/23 3599    perflutren lipid microspheres (DEFINITY) injection 1.5 mL, 1.5 mL, IntraVENous, ONCE PRN, Erin Ann APRN - CNS    amLODIPine (NORVASC) tablet 5 mg, 5 mg, Oral, Daily, Almita Ann APRN - CNS, 5 mg at 04/24/23 7829    atorvastatin (LIPITOR) tablet 20 mg, 20 mg, Oral, QPM, Mattie Ann, APRN - CNP, 20 mg at 04/23/23 2146    citalopram (CELEXA) tablet 20 mg, 20 mg, Oral, Daily, Mattie Ann, APRN - CNP, 20 mg at 04/23/23 0816    losartan (COZAAR) tablet 100 mg, 100 mg, Oral, QPM, Mattie Ann, APRN - CNP, 100 mg at 04/23/23 2146    [Held by provider] metFORMIN (GLUCOPHAGE) tablet 500 mg, 500 mg, Oral, BID WC, [place] Ann, APRN - CNP, 500 mg at 04/22/23 1536    vitamin D (CHOLECALCIFEROL) tablet 1,000 Units, 1,000 Units, Oral, Daily, [place] Ann, APRN - CNP, 1,000 Units at 04/23/23

## 2023-04-24 NOTE — PROCEDURES
[address]                  [address] [facility]  [facility]                            CARDIAC CATHETERIZATION    PATIENT NAME: Dewight Merlin                      :        1940  MED REC NO:   [id number]                            ROOM:       0915  ACCOUNT NO:   [de-identified]                           ADMIT DATE: 2023  PROVIDER:     Ban Grace MD    DATE OF PROCEDURE:  2023    PROCEDURES:  1. Coronary angiography. 2.  Conscious sedation using Versed and fentanyl. Indication #4, score of 8. INDICATION FOR PROCEDURE:  The patient is an 78-year-old female who was  admitted to the hospital with dizziness and elevated troponin, was found  to have abnormal stress test.  The patient was brought to the cath lab  for further evaluation. DESCRIPTION OF PROCEDURE:  After the appropriate informed consent, the  right wrist area was prepped and draped in the usual sterile fashion. A  timeout was called. The right wrist area was locally anesthetized with  2 mL of 2% lidocaine. A 21-gauge needle was used to access the right  radial artery. Attempts to advance wire were unsuccessful. After a  couple of attempts, the right radial approach was abandoned. Attention  was directed to the right groin area, which was locally anesthetized  with 10 mL of 2% lidocaine. A Cook needle was used to access the right  femoral artery using real-time ultrasound guidance. A 6-Japanese  introducer sheath was used to cannulate the right radial artery. 6-Japanese JL3.5 and 6-Japanese JR4 catheters were used for coronary  angiography in multiple projections. ANGIOGRAPHIC FINDINGS:  The left main coronary artery is a mid-size  vessel, arises normally from the left sinus of Valsalva. It has no CAD. It bifurcates into left anterior descending artery and left circumflex  artery. The left anterior descending artery is a long vessel, extends down to  the apex.

## 2023-04-24 NOTE — PROGRESS NOTES
Perfect serve sent to Dr. Paty Lorenzo -- 0900 scheduled norvasc and aspirin to be given prior to cath lab.

## 2023-04-24 NOTE — PROGRESS NOTES
PAS will be her in 30min to transport pt to [facility] for heart cath. Bedside RN aware. Cath lab staff made aware.

## 2023-04-25 ENCOUNTER — TELEPHONE (OUTPATIENT)
Dept: ADMINISTRATIVE | Age: 83
End: 2023-04-25

## 2023-04-25 NOTE — PROGRESS NOTES
Pt. Given discharge instructions  , right femoral puncture site dry and intact no drainage or bleeding. No hematoma noted. Pt. Family preent telemetry removed and returned to nurses station. Pt. Discharge with belongings clothes, bilateral hearing aids, and glasses and dentures. Discharged via wheelchair.

## 2023-05-15 NOTE — PROGRESS NOTES
Physician Progress Note      PATIENT:               Jorge Yoon  CSN #:                  [id number]  :                       1940  ADMIT DATE:       2023 9:04 PM  [address] [place] DATE:        2023 2:27 PM  RESPONDING  PROVIDER #:        Radha Rodriguez MD          QUERY TEXT:    Dr. Puma Ledbetter,    Patient admitted with dizziness likely secondary to hypertension. If possible,   please document in progress notes and discharge summary if you are evaluating   and/or treating any of the following: The medical record reflects the following:  Risk Factors: Hx of htn, DM and obesity  Clinical Indicators: per the H&P \"Dizziness associated with fall Most likely   secondary to markedly elevated blood pressures on admission\" on admission Bp   228/96, troponin 66  Treatment: Norvasc, losartan, hydralazine, Stress test, TTE, patient   transferred for left heart cath    Thank you,  Shamika Dominguez RN, CCDS  Clinical Documentation Integrity  [email or web address]    Hypertensive Urgency: Defined as SBP of >180 OR DBP >120 w/o associated organ   damage. S/s may or may not be present, but can include severe headache, SOB,   epistaxis, severe anxiety. Tx: adjustment of oral BP medication; IV meds not   usually required. Hypertensive Emergency: SBP of >180 OR DBP >120 w/ associated organ damage   (CVA, MI, acute CHF, THOMAS, encephalopathy, pulmonary edema, and unstable   angina). Documentation should include specific organ affected. Requires   immediate treatment, usually with IV meds. Hypertensive Crisis, unspecified: SBP of > 180 OR DBP > 120 and includes   damage to blood vessels, including inflammation, leakage of fluid or blood and   can cause stroke, headache, heart failure and eclampsia.   Source: [employer] and Quick Reference Guide  Options provided:  -- Hypertensive Crisis  -- Hypertensive Emergency  -- Hypertensive Urgency  -- Other - I will add my own diagnosis  -- Disagree - Not applicable / Not valid  --

## 2023-05-21 PROBLEM — R79.89 ELEVATED TROPONIN: Status: RESOLVED | Noted: 2023-04-21 | Resolved: 2023-05-21

## 2023-05-21 PROBLEM — R77.8 ELEVATED TROPONIN: Status: RESOLVED | Noted: 2023-04-21 | Resolved: 2023-05-21

## 2023-06-01 ENCOUNTER — OFFICE VISIT (OUTPATIENT)
Dept: CARDIOLOGY CLINIC | Age: 83
End: 2023-06-01
Payer: MEDICARE

## 2023-06-01 VITALS
BODY MASS INDEX: 31.2 KG/M2 | RESPIRATION RATE: 16 BRPM | DIASTOLIC BLOOD PRESSURE: 64 MMHG | SYSTOLIC BLOOD PRESSURE: 118 MMHG | WEIGHT: 176.1 LBS | HEIGHT: 63 IN | OXYGEN SATURATION: 91 % | HEART RATE: 58 BPM

## 2023-06-01 DIAGNOSIS — R06.09 DOE (DYSPNEA ON EXERTION): ICD-10-CM

## 2023-06-01 DIAGNOSIS — I10 PRIMARY HYPERTENSION: Primary | ICD-10-CM

## 2023-06-01 DIAGNOSIS — R06.09 DOE (DYSPNEA ON EXERTION): Primary | ICD-10-CM

## 2023-06-01 PROCEDURE — 3074F SYST BP LT 130 MM HG: CPT | Performed by: INTERNAL MEDICINE

## 2023-06-01 PROCEDURE — 1090F PRES/ABSN URINE INCON ASSESS: CPT | Performed by: INTERNAL MEDICINE

## 2023-06-01 PROCEDURE — 93000 ELECTROCARDIOGRAM COMPLETE: CPT | Performed by: INTERNAL MEDICINE

## 2023-06-01 PROCEDURE — G8427 DOCREV CUR MEDS BY ELIG CLIN: HCPCS | Performed by: INTERNAL MEDICINE

## 2023-06-01 PROCEDURE — 1123F ACP DISCUSS/DSCN MKR DOCD: CPT | Performed by: INTERNAL MEDICINE

## 2023-06-01 PROCEDURE — 3078F DIAST BP <80 MM HG: CPT | Performed by: INTERNAL MEDICINE

## 2023-06-01 PROCEDURE — 1036F TOBACCO NON-USER: CPT | Performed by: INTERNAL MEDICINE

## 2023-06-01 PROCEDURE — G8417 CALC BMI ABV UP PARAM F/U: HCPCS | Performed by: INTERNAL MEDICINE

## 2023-06-01 PROCEDURE — 99214 OFFICE O/P EST MOD 30 MIN: CPT | Performed by: INTERNAL MEDICINE

## 2023-06-01 PROCEDURE — G8400 PT W/DXA NO RESULTS DOC: HCPCS | Performed by: INTERNAL MEDICINE

## 2023-06-01 RX ORDER — HYDROCHLOROTHIAZIDE 25 MG/1
25 TABLET ORAL EVERY MORNING
Qty: 90 TABLET | Refills: 3 | Status: SHIPPED | OUTPATIENT
Start: 2023-06-01

## 2023-06-01 RX ORDER — FUROSEMIDE 40 MG/1
40 TABLET ORAL DAILY PRN
COMMUNITY
Start: 2023-05-24

## 2023-06-01 RX ORDER — POTASSIUM CHLORIDE 750 MG/1
CAPSULE, EXTENDED RELEASE ORAL
COMMUNITY
Start: 2023-05-30

## 2023-06-01 NOTE — PROGRESS NOTES
OUTPATIENT CARDIOLOGY FOLLOW-UP    Name: Jacinto Celeste    Age: 78 y.o. Primary Care Physician: Mitchel Friend MD    Date of Service: 2023    Chief Complaint:   Chief Complaint   Patient presents with    Hypertension    Shortness of Breath       Interim History:   Mrs. Luan Moreno is a 78-year-old  female with history of hypertension, hyperlipidemia, type 2 diabetes, obesity with a BMI of 33, obstructive sleep apnea compliant with CPAP use, chronic migraines, remote tobacco use  with 30 pack years of smoking quit in  and anxiety disorder. Brother  of MI at the age of 45 and father  of MI at 62. She was admitted to hospital on 2023 with a dizziness and also fall 1 week prior to admission. Prior to falling she did not eat or drink for 3 hours and then became dizzy and lightheaded after coming to the hospital.  Patient was admitted with a diagnosis of syncope. Patient was seen as a new consult on 2023 for new onset syncope and dizziness. Patient was diagnosed with new onset left bundle branch block with exertional dyspnea along with elevated troponin and positional vertigo. Patient underwent Lexiscan nuclear stress test which showed evidence of reversible defect involving the anterior and anterolateral wall suggestive of ischemia. Subsequently, patient underwent cardiac cath with no obstructive CAD. Patient was discharged home on medical therapy. She was discharged from the hospital, she has not had any ER visits or hospitalizations. He is compliant with medications, as well as salt and fluid intake. He does not take any over-the-counter arthritis medications. Denies any chest pain, still has dyspnea with exertion without any orthopnea, PND and leg edema. Walking for about 5 minutes makes her winded and fatigued and has to sit down.   She was admitted to the hospital in 2023 and underwent a stress test which came back abnormal and subsequently a cardiac

## 2023-06-01 NOTE — PATIENT INSTRUCTIONS
Stress test, echo and cardiac cath results were reviewed, as above and discussed with patient. We will schedule for pulmonary function test with DLCO for further evaluation of exertional dyspnea and to rule out pulmonary pathology. If the PFT shows no evidence of COPD or impaired DLCO then we can consider adding Jardiance and spironolactone for diastolic dysfunction. In the meantime, continue aspirin 80 mg p.o. daily and atorvastatin 20 mg p.o. daily  Continue Coreg 6.21 p.o. twice daily and losartan 100 mg p.o. daily for hypertension.   Patient was advised to decrease amlodipine to 5 mg p.o. daily, add hydrochlorothiazide 25 p.o. daily for better blood pressure control  Continue potassium 10 mg p.o. daily  She was advised to take Lasix 40 g p.o. daily only as needed for leg edema  Follow-up with me in 6 to 8 weeks

## 2023-06-02 ENCOUNTER — TELEPHONE (OUTPATIENT)
Dept: CARDIOLOGY CLINIC | Age: 83
End: 2023-06-02

## 2023-06-02 RX ORDER — AMLODIPINE BESYLATE 5 MG/1
5 TABLET ORAL DAILY
COMMUNITY
End: 2023-06-02 | Stop reason: SDUPTHER

## 2023-06-02 RX ORDER — AMLODIPINE BESYLATE 5 MG/1
5 TABLET ORAL DAILY
Qty: 90 TABLET | Refills: 3 | Status: SHIPPED | OUTPATIENT
Start: 2023-06-02

## 2023-06-02 NOTE — TELEPHONE ENCOUNTER
Per Dr. Samantha Lu, patient needs PFTs re: dyspnea on exertion. Prior auth pending.     PFTs (94017)  CUELLAR (R06.09)

## 2023-06-02 NOTE — TELEPHONE ENCOUNTER
Patient notified. Patient given [facility] phone number to call to schedule around her availability.

## 2023-06-20 ENCOUNTER — HOSPITAL ENCOUNTER (OUTPATIENT)
Dept: PULMONOLOGY | Age: 83
Discharge: HOME | End: 2023-06-20
Attending: INTERNAL MEDICINE
Payer: MEDICARE

## 2023-06-20 DIAGNOSIS — R06.09 DOE (DYSPNEA ON EXERTION): ICD-10-CM

## 2023-06-20 PROCEDURE — 94726 PLETHYSMOGRAPHY LUNG VOLUMES: CPT

## 2023-06-20 PROCEDURE — 94729 DIFFUSING CAPACITY: CPT

## 2023-06-20 PROCEDURE — 94060 EVALUATION OF WHEEZING: CPT

## 2023-06-21 ENCOUNTER — TELEPHONE (OUTPATIENT)
Dept: CARDIOLOGY CLINIC | Age: 83
End: 2023-06-21

## 2023-06-21 NOTE — TELEPHONE ENCOUNTER
----- Message from Gwendolyn Bolton MD sent at 6/21/2023  1:41 PM EDT -----  Normal lung function test.

## 2023-06-21 NOTE — PROCEDURES
[address] [address]                               PULMONARY FUNCTION    PATIENT NAME: Albania Cortes                      :        1940  MED REC NO:   [id number]                            ROOM:  ACCOUNT NO:   [de-identified]                           ADMIT DATE: 2023  PROVIDER:     Catalina Amanda MD    DATE OF PROCEDURE:  2023    DIAGNOSIS:  Dyspnea on exertion. FINDINGS:  This is a full PFT of good quality. The spirometry revealed  normal FVC and FEV1 with normal FEV1/FVC ratio and no bronchodilator  response. The best FEV1 was the prebronchodilator value of 1.68 liters  (93% of predicted). Lung volumes revealed normal total lung capacity and thoracic gas volume  with minimally increased residual volume. RV/TLC ratio was increased. Diffusion capacity was moderately reduced, but normalized completely  after correction for alveolar volume. IMPRESSION:  1. Normal spirometry without bronchodilator response. 2.  Mild air trapping on the lung volumes. 3.  Normal corrected diffusion capacity.         Gatito Lewis MD    D: 2023 16:29:17       T: 2023 16:31:38     [email or web address]  Job#: [id number]     Doc#: [id number]    CC:  Chidi Arredondo MD

## 2023-08-09 ENCOUNTER — OFFICE VISIT (OUTPATIENT)
Dept: CARDIOLOGY CLINIC | Age: 83
End: 2023-08-09
Payer: MEDICARE

## 2023-08-09 VITALS
RESPIRATION RATE: 15 BRPM | BODY MASS INDEX: 30.32 KG/M2 | DIASTOLIC BLOOD PRESSURE: 80 MMHG | HEART RATE: 53 BPM | SYSTOLIC BLOOD PRESSURE: 148 MMHG | HEIGHT: 64 IN | WEIGHT: 177.6 LBS

## 2023-08-09 DIAGNOSIS — I10 PRIMARY HYPERTENSION: Primary | ICD-10-CM

## 2023-08-09 PROCEDURE — 1036F TOBACCO NON-USER: CPT | Performed by: INTERNAL MEDICINE

## 2023-08-09 PROCEDURE — G8400 PT W/DXA NO RESULTS DOC: HCPCS | Performed by: INTERNAL MEDICINE

## 2023-08-09 PROCEDURE — 1123F ACP DISCUSS/DSCN MKR DOCD: CPT | Performed by: INTERNAL MEDICINE

## 2023-08-09 PROCEDURE — 93000 ELECTROCARDIOGRAM COMPLETE: CPT | Performed by: INTERNAL MEDICINE

## 2023-08-09 PROCEDURE — 1090F PRES/ABSN URINE INCON ASSESS: CPT | Performed by: INTERNAL MEDICINE

## 2023-08-09 PROCEDURE — 3077F SYST BP >= 140 MM HG: CPT | Performed by: INTERNAL MEDICINE

## 2023-08-09 PROCEDURE — G8417 CALC BMI ABV UP PARAM F/U: HCPCS | Performed by: INTERNAL MEDICINE

## 2023-08-09 PROCEDURE — 3079F DIAST BP 80-89 MM HG: CPT | Performed by: INTERNAL MEDICINE

## 2023-08-09 PROCEDURE — G8427 DOCREV CUR MEDS BY ELIG CLIN: HCPCS | Performed by: INTERNAL MEDICINE

## 2023-08-09 PROCEDURE — 99214 OFFICE O/P EST MOD 30 MIN: CPT | Performed by: INTERNAL MEDICINE

## 2023-08-09 NOTE — PATIENT INSTRUCTIONS
Stress test, echo and cardiac cath results were reviewed, as above and discussed with patient. PFTs reviewed, as above and they are normal.   Continue aspirin 81 mg p.o. daily and atorvastatin 20 mg p.o. daily  Continue Coreg 6.25 p.o. twice daily and losartan 100 mg p.o. daily for hypertension. Continue  amlodipine to 5 mg p.o. daily, and  hydrochlorothiazide 25 p.o. daily for better blood pressure control. Advised to cut down daily salt intake to less than 2 g per day.    Follow-up with me in 4 months

## 2023-08-09 NOTE — PROGRESS NOTES
OUTPATIENT CARDIOLOGY FOLLOW-UP    Name: Jaimie Mills    Age: 78 y.o. Primary Care Physician: Radha Del Valle MD    Date of Service: 2023    Chief Complaint:   Chief Complaint   Patient presents with    Hypertension     8 weeks-Patient has no complaints. Interim History:   Mrs. Bertha Palacio is a 41-year-old  female with history of hypertension, hyperlipidemia, type 2 diabetes, obesity with a BMI of 31, obstructive sleep apnea compliant with CPAP use, chronic migraines, remote tobacco use  with 30 pack years of smoking quit in  and anxiety disorder. Brother  of MI at the age of 45 and father  of MI at 62. She was admitted to hospital on 2023 with a dizziness and also fall 1 week prior to admission. Prior to falling she did not eat or drink for several hours and then became dizzy and lightheaded after coming to the hospital.  Patient was admitted with a diagnosis of syncope. Patient was seen as a new consult on 2023 for new onset syncope and dizziness. Patient was diagnosed with new onset left bundle branch block with exertional dyspnea along with elevated troponin and positional vertigo. Patient underwent Lexiscan nuclear stress test which showed evidence of reversible defect involving the anterior and anterolateral wall suggestive of ischemia. Subsequently, patient underwent cardiac cath with no obstructive CAD. Patient was discharged home on medical therapy. She was seen in the office on 2023, since last visit, she has not had any ER visits or hospitalizations. She is compliant with medications, as well as salt and fluid intake. She does not take any over-the-counter arthritis medications. Denies any chest pain, still has dyspnea with exertion without any orthopnea, PND and leg edema. Walking for about 5 minutes makes her winded and fatigued and has to sit down.   She was admitted to the hospital in 2023 and underwent a stress test which

## 2023-08-17 RX ORDER — CARVEDILOL 6.25 MG/1
6.25 TABLET ORAL 2 TIMES DAILY
Qty: 180 TABLET | Refills: 3 | Status: SHIPPED | OUTPATIENT
Start: 2023-08-17

## 2023-08-17 RX ORDER — HYDROCHLOROTHIAZIDE 25 MG/1
25 TABLET ORAL EVERY MORNING
Qty: 90 TABLET | Refills: 3 | Status: SHIPPED | OUTPATIENT
Start: 2023-08-17

## 2023-10-18 RX ORDER — CITALOPRAM 20 MG/1
20 TABLET ORAL DAILY
Qty: 90 TABLET | Refills: 0 | Status: SHIPPED | OUTPATIENT
Start: 2023-10-18

## 2023-11-01 ENCOUNTER — OFFICE VISIT (OUTPATIENT)
Dept: NEUROLOGY | Age: 83
End: 2023-11-01

## 2023-11-01 VITALS
TEMPERATURE: 98.2 F | RESPIRATION RATE: 18 BRPM | WEIGHT: 179 LBS | BODY MASS INDEX: 31.21 KG/M2 | SYSTOLIC BLOOD PRESSURE: 134 MMHG | HEART RATE: 62 BPM | DIASTOLIC BLOOD PRESSURE: 63 MMHG | OXYGEN SATURATION: 97 %

## 2023-11-01 DIAGNOSIS — G43.909 MIGRAINE WITHOUT STATUS MIGRAINOSUS, NOT INTRACTABLE, UNSPECIFIED MIGRAINE TYPE: Primary | ICD-10-CM

## 2023-11-01 NOTE — PROGRESS NOTES
Travis Rosario is a 78 y.o. right handed female     Patient was referred for abnormal spells in her vision as well as speech and language difficulties with accompanying headache. She previously followed with neurology and obtain MRI of the brain MRAs as well as echo and EEG testing all proved essentially unrevealing. Given that her spells seem to occur with stressful events in her life there was a question of anxiety being the underlying cause and she was started on citalopram.    For a number of years her spells did improve however over the past 6 months she has copious amounts of stressful events in her life including her brother being diagnosed with prostatic cancer her  having a stroke and now her other friend who is considered to her brother to her also being diagnosed with cancer. Her migraines start off with flashes in her vision that appear like lightning streaks those last for a few minutes and are accompanied by speech and language difficulties she has no trouble understanding people but she has trouble conveying spoken word. Occasionally she would complain of a headache with the spells and occasionally she will have a headache without any of these spells. She responded very well to Nurtec samples but is hopeful to get off this medication which we discussed at this time  She states that she has not needed to use it in the last 10 months which I have no issue with at this time    Growing up she never suffered with headaches until the early 1980s where she would then in her early 45s. She noticed that after change of life she started getting headaches and she started getting these flashes in her vision. Again the used to occur very few and far between however as she has gotten older her spells seem to have increased in frequency. Her brother her sister and her mother all suffer from headaches and migraines.     Again she did have an EEG which was unrevealing    No chest pain or

## 2023-11-22 RX ORDER — AMLODIPINE BESYLATE 5 MG/1
5 TABLET ORAL DAILY
Qty: 90 TABLET | Refills: 3 | Status: SHIPPED | OUTPATIENT
Start: 2023-11-22

## 2024-01-08 ENCOUNTER — OFFICE VISIT (OUTPATIENT)
Dept: CARDIOLOGY CLINIC | Age: 84
End: 2024-01-08
Payer: MEDICARE

## 2024-01-08 VITALS
WEIGHT: 182 LBS | HEIGHT: 63 IN | SYSTOLIC BLOOD PRESSURE: 126 MMHG | BODY MASS INDEX: 32.25 KG/M2 | RESPIRATION RATE: 18 BRPM | DIASTOLIC BLOOD PRESSURE: 72 MMHG

## 2024-01-08 DIAGNOSIS — I10 PRIMARY HYPERTENSION: Primary | ICD-10-CM

## 2024-01-08 PROCEDURE — G8484 FLU IMMUNIZE NO ADMIN: HCPCS | Performed by: INTERNAL MEDICINE

## 2024-01-08 PROCEDURE — 1036F TOBACCO NON-USER: CPT | Performed by: INTERNAL MEDICINE

## 2024-01-08 PROCEDURE — 1123F ACP DISCUSS/DSCN MKR DOCD: CPT | Performed by: INTERNAL MEDICINE

## 2024-01-08 PROCEDURE — 93000 ELECTROCARDIOGRAM COMPLETE: CPT | Performed by: INTERNAL MEDICINE

## 2024-01-08 PROCEDURE — 3074F SYST BP LT 130 MM HG: CPT | Performed by: INTERNAL MEDICINE

## 2024-01-08 PROCEDURE — 3078F DIAST BP <80 MM HG: CPT | Performed by: INTERNAL MEDICINE

## 2024-01-08 PROCEDURE — G8417 CALC BMI ABV UP PARAM F/U: HCPCS | Performed by: INTERNAL MEDICINE

## 2024-01-08 PROCEDURE — G8400 PT W/DXA NO RESULTS DOC: HCPCS | Performed by: INTERNAL MEDICINE

## 2024-01-08 PROCEDURE — 99214 OFFICE O/P EST MOD 30 MIN: CPT | Performed by: INTERNAL MEDICINE

## 2024-01-08 PROCEDURE — 1090F PRES/ABSN URINE INCON ASSESS: CPT | Performed by: INTERNAL MEDICINE

## 2024-01-08 PROCEDURE — G8427 DOCREV CUR MEDS BY ELIG CLIN: HCPCS | Performed by: INTERNAL MEDICINE

## 2024-01-08 NOTE — PATIENT INSTRUCTIONS
Stress test, echo and cardiac cath results were reviewed, as above and discussed with patient.  PFTs reviewed, as above and they are normal.   Continue aspirin 81 mg p.o. daily and atorvastatin 20 mg p.o. daily  Continue Coreg 6.25 p.o. twice daily and losartan 100 mg p.o. daily for hypertension.  Continue  amlodipine to 5 mg p.o. daily, and  hydrochlorothiazide 25 p.o. daily for better blood pressure control.  If blood pressure remains elevated then will consider adding hydralazine.  Advised to cut down daily salt intake to less than 2 g per day.   Continue potassium 10 meq p.o. daily  Follow-up with me in 6 months

## 2024-01-08 NOTE — PROGRESS NOTES
arises normally from the left sinus of Valsalva.  It has no CAD.  It bifurcates into left anterior descending artery and left circumflex  artery.     The left anterior descending artery is a long vessel, extends down to  the apex.  It has 40% to 50% disease before the takeoff of the third  large diagonal branch.  There is another sequential lesion estimated at  40% in the distal segment of the LAD.  The diagonal branches have no  significant CAD.     The left circumflex artery is a large vessel, gives off a large OM  branch and couple of small ones.  The left circumflex artery has two  sequential lesions, the first one in the proximal segment estimated at  30%.  The second one also just before the takeoff of the large OM branch  estimated at 20% to 30%.  The right coronary artery is a dominant  circulation that has 50% disease after the junction between the proximal  and the mid segment.  There is another 50% disease in the mid segment  with aneurysmal dilatation in that area.  The right PDA has 40% ostial  disease.  The rest of the vessel has luminal irregularities.     At the end of the procedure, the catheter and the wire were pulled out  from the body, the sheath was pulled out from the body, and manual  pressure was applied to the right groin area until effective hemostasis  was achieved.    PFTs - 6/20/213  IMPRESSION:  1.  Normal spirometry without bronchodilator response.  2.  Mild air trapping on the lung volumes.  3.  Normal corrected diffusion capacity.      The ASCVD Risk score (Leonor DK, et al., 2019) failed to calculate for the following reasons:    The 2019 ASCVD risk score is only valid for ages 40 to 79        ASSESSMENT:  CAD with abnormal stress test and cardiac cath showing moderate nonobstructive CAD, no further episodes of chest pain  Persistent dyspnea with exertion secondary to Chronic HFpEF, improved,  normal PFTs  Chronic left bundle branch block  History of hypertension,  not well

## 2024-04-03 RX ORDER — CITALOPRAM 20 MG/1
20 TABLET ORAL DAILY
Qty: 90 TABLET | Refills: 3 | OUTPATIENT
Start: 2024-04-03

## 2024-04-03 NOTE — TELEPHONE ENCOUNTER
Voicemail from patient stating that Salo told her to call if she decided to restart her medicine.    She started it again and would like a call back?

## 2024-04-04 RX ORDER — CITALOPRAM 20 MG/1
20 TABLET ORAL DAILY
Qty: 90 TABLET | Refills: 1 | Status: SHIPPED | OUTPATIENT
Start: 2024-04-04

## 2024-06-10 ENCOUNTER — TELEPHONE (OUTPATIENT)
Dept: ADMINISTRATIVE | Age: 84
End: 2024-06-10

## 2024-06-10 NOTE — TELEPHONE ENCOUNTER
Patient is needing to r/s her 6 month appt with Dr. Houser as her  already has an appt tomorrow. Is there any way we can get her squeezed in?

## 2024-06-12 ENCOUNTER — OFFICE VISIT (OUTPATIENT)
Dept: CARDIOLOGY CLINIC | Age: 84
End: 2024-06-12
Payer: MEDICARE

## 2024-06-12 VITALS
WEIGHT: 180.1 LBS | BODY MASS INDEX: 31.91 KG/M2 | OXYGEN SATURATION: 94 % | RESPIRATION RATE: 16 BRPM | HEART RATE: 53 BPM | HEIGHT: 63 IN | SYSTOLIC BLOOD PRESSURE: 127 MMHG | DIASTOLIC BLOOD PRESSURE: 60 MMHG

## 2024-06-12 DIAGNOSIS — I10 HYPERTENSION, UNSPECIFIED TYPE: Primary | ICD-10-CM

## 2024-06-12 PROCEDURE — 93000 ELECTROCARDIOGRAM COMPLETE: CPT | Performed by: INTERNAL MEDICINE

## 2024-06-12 PROCEDURE — 1090F PRES/ABSN URINE INCON ASSESS: CPT | Performed by: INTERNAL MEDICINE

## 2024-06-12 PROCEDURE — 1036F TOBACCO NON-USER: CPT | Performed by: INTERNAL MEDICINE

## 2024-06-12 PROCEDURE — G8400 PT W/DXA NO RESULTS DOC: HCPCS | Performed by: INTERNAL MEDICINE

## 2024-06-12 PROCEDURE — G8427 DOCREV CUR MEDS BY ELIG CLIN: HCPCS | Performed by: INTERNAL MEDICINE

## 2024-06-12 PROCEDURE — G8417 CALC BMI ABV UP PARAM F/U: HCPCS | Performed by: INTERNAL MEDICINE

## 2024-06-12 PROCEDURE — 3078F DIAST BP <80 MM HG: CPT | Performed by: INTERNAL MEDICINE

## 2024-06-12 PROCEDURE — 3074F SYST BP LT 130 MM HG: CPT | Performed by: INTERNAL MEDICINE

## 2024-06-12 PROCEDURE — 1123F ACP DISCUSS/DSCN MKR DOCD: CPT | Performed by: INTERNAL MEDICINE

## 2024-06-12 PROCEDURE — 99214 OFFICE O/P EST MOD 30 MIN: CPT | Performed by: INTERNAL MEDICINE

## 2024-06-12 RX ORDER — AMLODIPINE BESYLATE 5 MG/1
5 TABLET ORAL DAILY
Qty: 90 TABLET | Refills: 3 | Status: SHIPPED | OUTPATIENT
Start: 2024-06-12

## 2024-06-12 RX ORDER — CARVEDILOL 6.25 MG/1
6.25 TABLET ORAL 2 TIMES DAILY
Qty: 180 TABLET | Refills: 3 | Status: SHIPPED | OUTPATIENT
Start: 2024-06-12

## 2024-06-12 NOTE — PATIENT INSTRUCTIONS
Stress test, echo and cardiac cath results were reviewed, as above and discussed with patient.  PFTs reviewed, as above and they are normal.   Continue aspirin 81 mg p.o. daily and atorvastatin 20 mg p.o. daily  Continue Coreg 6.25 p.o. twice daily and losartan 100 mg p.o. daily for hypertension.  Continue  amlodipine to 5 mg p.o. daily.  Will stop hydrochlorothiazide due to hyperglycemia.  Will add Jardiance 10 mg p.o. daily for diastolic heart failure.  Patient was advised to call me if she develops any episodes of bladder infections.  Patient was advised to monitor blood pressure and heart rate daily and call me in 1 week.  If the blood pressure goes up then we may to consider starting back on hydrochlorothiazide.  Advised to cut down daily salt intake to less than 2 g per day.   Continue potassium 10 meq p.o. daily  Follow-up with me in 6 months

## 2024-06-12 NOTE — PROGRESS NOTES
OUTPATIENT CARDIOLOGY FOLLOW-UP    Name: Tan Da Silva    Age: 83 y.o.    Primary Care Physician: Rey Wahl MD    Date of Service: 2024    Chief Complaint:   Chief Complaint   Patient presents with    Hyperlipidemia    Shortness of Breath       Interim History:   Mrs. Da Silva is a 83-year-old  female with history of hypertension, hyperlipidemia, type 2 diabetes, obesity with a BMI of 31, obstructive sleep apnea compliant with CPAP use, chronic migraines, remote tobacco use  with 30 pack years of smoking quit in  and anxiety disorder. Brother  of MI at the age of 38 and father  of MI at 58.  She was admitted to hospital on 2023 with a dizziness and also fall 1 week prior to admission.  Prior to falling she did not eat or drink for several hours and then became dizzy and lightheaded after coming to the hospital.  Patient was admitted with a diagnosis of syncope.  Patient was seen as a new consult on 2023 for new onset syncope and dizziness.  Patient was diagnosed with new onset left bundle branch block with exertional dyspnea along with elevated troponin and positional vertigo.  Patient underwent Lexiscan nuclear stress test which showed evidence of reversible defect involving the anterior and anterolateral wall suggestive of ischemia.  Subsequently, patient underwent cardiac cath with no obstructive CAD.  Patient was discharged home on medical therapy.    She was seen in the office on 24, since last visit, she has not had any ER visits or hospitalizations.  She is compliant with medications, as well as salt and fluid intake.  She does not take any over-the-counter arthritis medications.    Denies any chest pain, dyspnea with exertion without any orthopnea, PND and leg edema.  Walking for about 5 minutes makes fatigued and has to sit down because of back pain leg fatigue.      She was admitted to the hospital in 2023 and underwent a stress test which came

## 2024-07-16 ENCOUNTER — OFFICE VISIT (OUTPATIENT)
Dept: NEUROLOGY | Age: 84
End: 2024-07-16
Payer: MEDICARE

## 2024-07-16 VITALS
HEART RATE: 61 BPM | DIASTOLIC BLOOD PRESSURE: 63 MMHG | BODY MASS INDEX: 31.89 KG/M2 | OXYGEN SATURATION: 96 % | TEMPERATURE: 97.6 F | SYSTOLIC BLOOD PRESSURE: 125 MMHG | WEIGHT: 180 LBS

## 2024-07-16 DIAGNOSIS — G43.909 MIGRAINE WITHOUT STATUS MIGRAINOSUS, NOT INTRACTABLE, UNSPECIFIED MIGRAINE TYPE: Primary | ICD-10-CM

## 2024-07-16 PROCEDURE — 1123F ACP DISCUSS/DSCN MKR DOCD: CPT | Performed by: CLINICAL NURSE SPECIALIST

## 2024-07-16 PROCEDURE — G8427 DOCREV CUR MEDS BY ELIG CLIN: HCPCS | Performed by: CLINICAL NURSE SPECIALIST

## 2024-07-16 PROCEDURE — 1090F PRES/ABSN URINE INCON ASSESS: CPT | Performed by: CLINICAL NURSE SPECIALIST

## 2024-07-16 PROCEDURE — G8400 PT W/DXA NO RESULTS DOC: HCPCS | Performed by: CLINICAL NURSE SPECIALIST

## 2024-07-16 PROCEDURE — 3078F DIAST BP <80 MM HG: CPT | Performed by: CLINICAL NURSE SPECIALIST

## 2024-07-16 PROCEDURE — 3074F SYST BP LT 130 MM HG: CPT | Performed by: CLINICAL NURSE SPECIALIST

## 2024-07-16 PROCEDURE — 1036F TOBACCO NON-USER: CPT | Performed by: CLINICAL NURSE SPECIALIST

## 2024-07-16 PROCEDURE — 99214 OFFICE O/P EST MOD 30 MIN: CPT | Performed by: CLINICAL NURSE SPECIALIST

## 2024-07-16 PROCEDURE — G8417 CALC BMI ABV UP PARAM F/U: HCPCS | Performed by: CLINICAL NURSE SPECIALIST

## 2024-07-16 RX ORDER — CITALOPRAM 20 MG/1
20 TABLET ORAL DAILY
Qty: 90 TABLET | Refills: 1 | Status: SHIPPED | OUTPATIENT
Start: 2024-07-16

## 2024-07-16 NOTE — PROGRESS NOTES
Tan Da Silva is a 83 y.o. right handed female     Patient was referred for abnormal spells in her vision as well as speech and language difficulties with accompanying headache.  She previously followed with neurology and obtain MRI of the brain MRAs as well as echo and EEG testing all proved essentially unrevealing.    Given that her spells seem to occur with stressful events in her life there was a question of anxiety being the underlying cause and she was started on citalopram.    For a number of years her spells did improve however over the past 6 months she has copious amounts of stressful events in her life including her brother being diagnosed with prostatic cancer her  having a stroke and now her other friend who is considered to her brother to her also being diagnosed with cancer.  Her migraines start off with flashes in her vision that appear like lightning streaks those last for a few minutes and are accompanied by speech and language difficulties she has no trouble understanding people but she has trouble conveying spoken word.  Occasionally she would complain of a headache with the spells and occasionally she will have a headache without any of these spells.    She responded very well to Nurtec samples but ran out over the last few months and unfortunately had a spell without any medications therefore this appointment was set up today    Growing up she never suffered with headaches until the early 1980s where she would then in her early 40s.  She noticed that after change of life she started getting headaches and she started getting these flashes in her vision.  Again the used to occur very few and far between however as she has gotten older her spells seem to have increased in frequency.    Her brother her sister and her mother all suffer from headaches and migraines.    Again she did have an EEG which was unrevealing    Allergies as of 07/16/2024 - Fully Reviewed 07/16/2024   Allergen Reaction

## 2024-11-25 ENCOUNTER — OFFICE VISIT (OUTPATIENT)
Dept: CARDIOLOGY CLINIC | Age: 84
End: 2024-11-25

## 2024-11-25 VITALS
HEIGHT: 63 IN | DIASTOLIC BLOOD PRESSURE: 60 MMHG | TEMPERATURE: 97.2 F | HEART RATE: 56 BPM | RESPIRATION RATE: 18 BRPM | OXYGEN SATURATION: 93 % | WEIGHT: 180.5 LBS | BODY MASS INDEX: 31.98 KG/M2 | SYSTOLIC BLOOD PRESSURE: 130 MMHG

## 2024-11-25 DIAGNOSIS — I44.7 LBBB (LEFT BUNDLE BRANCH BLOCK): Primary | ICD-10-CM

## 2024-11-25 DIAGNOSIS — I10 PRIMARY HYPERTENSION: ICD-10-CM

## 2024-11-25 DIAGNOSIS — I50.32 CHRONIC DIASTOLIC HEART FAILURE WITH PRESERVED EJECTION FRACTION (HCC): ICD-10-CM

## 2024-11-25 RX ORDER — PREDNISOLONE ACETATE 10 MG/ML
SUSPENSION/ DROPS OPHTHALMIC AS NEEDED
COMMUNITY
Start: 2024-09-04

## 2024-11-25 RX ORDER — SPIRONOLACTONE 25 MG/1
25 TABLET ORAL DAILY
Qty: 90 TABLET | Refills: 3 | Status: SHIPPED | OUTPATIENT
Start: 2024-11-25

## 2024-11-25 RX ORDER — ASCORBIC ACID 500 MG
500 TABLET ORAL DAILY
COMMUNITY

## 2024-11-25 RX ORDER — SPIRONOLACTONE 25 MG/1
25 TABLET ORAL DAILY
Qty: 30 TABLET | Refills: 4 | Status: SHIPPED
Start: 2024-11-25 | End: 2024-11-25 | Stop reason: SDUPTHER

## 2024-11-25 RX ORDER — VIT C/B6/B5/MAGNESIUM/HERB 173 50-5-6-5MG
CAPSULE ORAL DAILY
COMMUNITY

## 2024-11-25 NOTE — PATIENT INSTRUCTIONS
Stress test, echo and cardiac cath results were reviewed, as above and discussed with patient.  PFTs reviewed, as above and they are normal.   Continue aspirin 81 mg p.o. daily and atorvastatin 20 mg p.o. daily  Continue Coreg 6.25 p.o. twice daily and losartan 100 mg p.o. daily for hypertension.  Continue  amlodipine to 5 mg p.o. daily.  Continue Jardiance 10 mg p.o. daily for diastolic heart failure.  Advised to start spironolactone 25 mg p.o. daily for diastolic heart failure.    Recommended to get a BMP in 1 week.  She was also advised to monitor blood pressure daily and call me in 1 week.    Patient was advised to monitor blood pressure and heart rate daily and call me in 1 week.  If the blood pressure goes up then we may to consider starting back on hydrochlorothiazide.  Advised to cut down daily salt intake to less than 2 g per day.   Advised to stay well-hydrated  Follow-up with me in 6 months

## 2024-11-25 NOTE — PROGRESS NOTES
which came back abnormal and subsequently a cardiac evaluation which showed no obstructive CAD.  Patient with medical therapy..  She is a former smoker smoked for about 30 years and quit in 1986.  Patient denies any history of COPD.  She told me she had a COVID infection in December 2020, since that time she been having dyspnea with exertion prior to that symptom symptoms.  Chest x-ray in April 2023 at the time of hospitalization was completely normal.  She was initiated on Lasix 40 mg p.o. daily potassium over a week back.  Now, she noticed improvement in her swelling of the legs. She is not taking lasix any more.   She is compliant with medications as well as salt and fluid intake.  She noticed leg edema after increasing amlodipine to 10 mg p.o. daily during her hospitalization. Home Bps are reviewed and they are in the 130/60s range.     She is still struggling to control her blood sugars.  Still gets winded with exertion and Jardiance is helping her symptoms.  Now, she is exercising more without any chest pain or dyspnea.  Home Bps 130/60. Using C-pap for sleep apnea and breathing better.     No new cardiac complaints since last cardiology evaluation except for dyspnea with exertion,  She denies recent chest pain, palpitations, lightheadedness, dizziness, syncope, PND, or orthopnea. SR on EKG.    Review of Systems:   Cardiac: As per HPI  General: No fever, chills  Pulmonary: As per HPI  HEENT: No visual disturbances, difficult swallowing  GI: No nausea, vomiting  Endocrine: No thyroid disease or DM  Musculoskeletal: GARCIA x 4, no focal motor deficits  Skin: Intact, no rashes  Neuro/Psych: No headache or seizures    Past Medical History:  Past Medical History:   Diagnosis Date    Cancer (HCC)     skin    Diabetes mellitus (HCC)     Diverticular disease     Hypertension        Past Surgical History:  Past Surgical History:   Procedure Laterality Date    BREAST SURGERY      Mastecotomy left side    CARDIAC

## 2024-12-04 ENCOUNTER — TELEPHONE (OUTPATIENT)
Dept: CARDIOLOGY CLINIC | Age: 84
End: 2024-12-04

## 2024-12-04 RX ORDER — CITALOPRAM HYDROBROMIDE 20 MG/1
20 TABLET ORAL DAILY
Qty: 90 TABLET | Refills: 1 | Status: SHIPPED | OUTPATIENT
Start: 2024-12-04

## 2024-12-04 NOTE — TELEPHONE ENCOUNTER
Patient called with BP/P readings since starting Spironolactone 25 mg daily on 11/25/24.    137/58 (62)  136/69   131/53 (58)  130/56 (55)  128/56 (65)  133/55 (68)  136/58 yesterday

## 2024-12-04 NOTE — TELEPHONE ENCOUNTER
Blood pressure reviewed slightly up but stable.  Please ask her to get a BMP if it is not done.  Continue spironolactone rest of the current medications.  BMP was ordered on 11/25/2024 and was recommended to get on 12/2/2024.

## 2024-12-04 NOTE — TELEPHONE ENCOUNTER
Last seen 7/16/2024  Next appt Visit date not found    Requested Prescriptions     Pending Prescriptions Disp Refills    citalopram (CELEXA) 20 MG tablet [Pharmacy Med Name: Citalopram Hydrobromide Oral Tablet 20 MG] 90 tablet 3     Sig: TAKE 1 TABLET EVERY DAY        Plan:      Samples of Nurtec provided at this time  Celexa also renewed     Follow-up next summer unless needed earlier     Salo Torres, APRN - CNS  2:51 PM  7/16/2024

## 2024-12-05 NOTE — TELEPHONE ENCOUNTER
Patient notified of Dr. Houser's assessment/recommendations.  She had BMP done at PCP office on Tuesday, will obtain those results.

## 2024-12-06 ENCOUNTER — TELEPHONE (OUTPATIENT)
Dept: CARDIOLOGY CLINIC | Age: 84
End: 2024-12-06

## 2024-12-06 DIAGNOSIS — I50.32 CHRONIC DIASTOLIC HEART FAILURE WITH PRESERVED EJECTION FRACTION (HCC): ICD-10-CM

## 2024-12-06 NOTE — TELEPHONE ENCOUNTER
----- Message from Dr. Vinnie Houser MD sent at 12/6/2024  2:41 PM EST -----  Blood work showed normal potassium and renal functions.  She can continue spironolactone 25 mg p.o. daily and continue rest of the current medications.

## 2025-01-29 RX ORDER — CARVEDILOL 6.25 MG/1
6.25 TABLET ORAL 2 TIMES DAILY
Qty: 180 TABLET | Refills: 3 | Status: SHIPPED | OUTPATIENT
Start: 2025-01-29

## 2025-02-12 RX ORDER — AMLODIPINE BESYLATE 5 MG/1
5 TABLET ORAL DAILY
Qty: 90 TABLET | Refills: 3 | Status: SHIPPED | OUTPATIENT
Start: 2025-02-12

## 2025-05-28 ENCOUNTER — OFFICE VISIT (OUTPATIENT)
Dept: CARDIOLOGY CLINIC | Age: 85
End: 2025-05-28
Payer: MEDICARE

## 2025-05-28 VITALS
WEIGHT: 172 LBS | HEART RATE: 63 BPM | SYSTOLIC BLOOD PRESSURE: 136 MMHG | BODY MASS INDEX: 30.48 KG/M2 | HEIGHT: 63 IN | TEMPERATURE: 96.8 F | DIASTOLIC BLOOD PRESSURE: 64 MMHG | RESPIRATION RATE: 18 BRPM | OXYGEN SATURATION: 93 %

## 2025-05-28 DIAGNOSIS — I25.84 CORONARY ARTERY DISEASE DUE TO CALCIFIED CORONARY LESION: ICD-10-CM

## 2025-05-28 DIAGNOSIS — I10 PRIMARY HYPERTENSION: ICD-10-CM

## 2025-05-28 DIAGNOSIS — I25.10 CORONARY ARTERY DISEASE DUE TO CALCIFIED CORONARY LESION: ICD-10-CM

## 2025-05-28 DIAGNOSIS — I44.7 LBBB (LEFT BUNDLE BRANCH BLOCK): Primary | ICD-10-CM

## 2025-05-28 PROCEDURE — 99214 OFFICE O/P EST MOD 30 MIN: CPT | Performed by: INTERNAL MEDICINE

## 2025-05-28 PROCEDURE — 93000 ELECTROCARDIOGRAM COMPLETE: CPT | Performed by: INTERNAL MEDICINE

## 2025-05-28 PROCEDURE — 1123F ACP DISCUSS/DSCN MKR DOCD: CPT | Performed by: INTERNAL MEDICINE

## 2025-05-28 PROCEDURE — G2211 COMPLEX E/M VISIT ADD ON: HCPCS | Performed by: INTERNAL MEDICINE

## 2025-05-28 PROCEDURE — 1159F MED LIST DOCD IN RCRD: CPT | Performed by: INTERNAL MEDICINE

## 2025-05-28 RX ORDER — CARVEDILOL 6.25 MG/1
6.25 TABLET ORAL 2 TIMES DAILY
Qty: 60 TABLET | Refills: 0 | Status: SHIPPED | OUTPATIENT
Start: 2025-05-28 | End: 2025-05-29 | Stop reason: SDUPTHER

## 2025-05-28 RX ORDER — AMLODIPINE BESYLATE 5 MG/1
5 TABLET ORAL DAILY
Qty: 30 TABLET | Refills: 3 | Status: SHIPPED | OUTPATIENT
Start: 2025-05-28 | End: 2025-05-29 | Stop reason: SDUPTHER

## 2025-05-28 NOTE — PROGRESS NOTES
OUTPATIENT CARDIOLOGY FOLLOW-UP    Name: Tan Da Silva    Age: 84 y.o.    Primary Care Physician: Rey Wahl MD    Date of Service: 2025    Chief Complaint:   Chief Complaint   Patient presents with    Coronary Artery Disease    Hypertension       Interim History:   Mrs. Da Silva is a 84-year-old  female with history of hypertension, hyperlipidemia, type 2 diabetes, obesity with a BMI of 31, obstructive sleep apnea compliant with CPAP use, chronic migraines, remote tobacco use  with 30 pack years of smoking quit in  and anxiety disorder. Brother  of MI at the age of 38 and father  of MI at 58.  She was admitted to hospital on 2023 with a dizziness and also fall 1 week prior to admission.  Prior to falling she did not eat or drink for several hours and then became dizzy and lightheaded after coming to the hospital.  Patient was admitted with a diagnosis of syncope.  Patient was seen as a new consult on 2023 for new onset syncope and dizziness.  Patient was diagnosed with new onset left bundle branch block with exertional dyspnea along with elevated troponin and positional vertigo.  Patient underwent Lexiscan nuclear stress test which showed evidence of reversible defect involving the anterior and anterolateral wall suggestive of ischemia.  Subsequently, patient underwent cardiac cath with no obstructive CAD.  Patient was discharged home on medical therapy.    She was seen in the office on 24, since last visit, she has not had any ER visits or hospitalizations.  She is compliant with medications, as well as salt and fluid intake.  She does not take any over-the-counter arthritis medications. She ran out of meds since .     Denies any chest pain, dyspnea with exertion without any orthopnea, PND and leg edema.  Walking for about 5 minutes makes fatigued and has to sit down because of back pain leg fatigue.      She was admitted to the hospital in 2023

## 2025-05-29 RX ORDER — AMLODIPINE BESYLATE 5 MG/1
5 TABLET ORAL DAILY
Qty: 30 TABLET | Refills: 0 | Status: SHIPPED | OUTPATIENT
Start: 2025-05-29

## 2025-05-29 RX ORDER — CARVEDILOL 6.25 MG/1
6.25 TABLET ORAL 2 TIMES DAILY
Qty: 60 TABLET | Refills: 0 | Status: SHIPPED | OUTPATIENT
Start: 2025-05-29

## 2025-06-05 ENCOUNTER — TELEPHONE (OUTPATIENT)
Dept: CARDIOLOGY CLINIC | Age: 85
End: 2025-06-05

## 2025-06-05 NOTE — TELEPHONE ENCOUNTER
Patient dropped off BP readings (scanned to chart).  Plan on 5/28/25 was to consider starting back on Hydrochlorothiazide.

## 2025-06-05 NOTE — TELEPHONE ENCOUNTER
Overall, blood pressures are well-controlled except for 1 reading.  Continue current medications and follow-up with me as scheduled.  Follow low-salt diet.

## 2025-07-11 RX ORDER — CARVEDILOL 6.25 MG/1
6.25 TABLET ORAL 2 TIMES DAILY
Qty: 60 TABLET | Refills: 5 | Status: SHIPPED | OUTPATIENT
Start: 2025-07-11

## 2025-07-21 ENCOUNTER — OFFICE VISIT (OUTPATIENT)
Dept: NEUROLOGY | Age: 85
End: 2025-07-21
Payer: MEDICARE

## 2025-07-21 VITALS
BODY MASS INDEX: 30.47 KG/M2 | TEMPERATURE: 97.5 F | DIASTOLIC BLOOD PRESSURE: 52 MMHG | OXYGEN SATURATION: 97 % | HEART RATE: 52 BPM | WEIGHT: 172 LBS | SYSTOLIC BLOOD PRESSURE: 141 MMHG

## 2025-07-21 DIAGNOSIS — G43.909 MIGRAINE WITHOUT STATUS MIGRAINOSUS, NOT INTRACTABLE, UNSPECIFIED MIGRAINE TYPE: Primary | ICD-10-CM

## 2025-07-21 PROCEDURE — 1123F ACP DISCUSS/DSCN MKR DOCD: CPT | Performed by: CLINICAL NURSE SPECIALIST

## 2025-07-21 PROCEDURE — 99214 OFFICE O/P EST MOD 30 MIN: CPT | Performed by: CLINICAL NURSE SPECIALIST

## 2025-07-21 PROCEDURE — 1159F MED LIST DOCD IN RCRD: CPT | Performed by: CLINICAL NURSE SPECIALIST

## 2025-07-21 RX ORDER — ESCITALOPRAM OXALATE 10 MG/1
10 TABLET ORAL DAILY
Qty: 90 TABLET | Refills: 2 | Status: SHIPPED | OUTPATIENT
Start: 2025-07-21

## 2025-07-21 NOTE — PROGRESS NOTES
Tan Da Silva is a 84 y.o. right handed female     Patient was referred for abnormal spells in her vision as well as speech and language difficulties with accompanying headache.  She previously followed with neurology and obtain MRI of the brain MRAs as well as echo and EEG testing all proved essentially unrevealing.    Given that her spells seem to occur with stressful events in her life there was a question of anxiety being the underlying cause and she was started on citalopram.  At first she did not notice improvement now maintained on 20 mg and asking if there is alternative options.    she has been under significant amount of stress with her  and brother diagnosis     Her migraines have been under good control responding very well to Nurtec as needed.  Her migraines start off with flashes in her vision that appear like lightning streaks those last for a few minutes and are accompanied by speech and language difficulties she has no trouble understanding people but she has trouble conveying spoken word.      She again responded very well to Nurtec samples     Growing up she never suffered with headaches until the early 1980s where she would then in her early 40s.  She noticed that after change of life she started getting headaches and she started getting these flashes in her vision.  Again the used to occur very few and far between however as she has gotten older her spells seem to have increased in frequency.    Her brother her sister and her mother all suffer from headaches and migraines.    Allergies as of 07/21/2025 - Fully Reviewed 07/21/2025   Allergen Reaction Noted    Darvon [propoxyphene]  04/29/2016    Prednisone Other (See Comments) 04/29/2016    Strawberry extract Hives 04/29/2016    Tomato Hives 04/29/2016     Objective:     BP (!) 141/52 (BP Site: Right Upper Arm, Patient Position: Sitting, BP Cuff Size: Large Adult)   Pulse 52   Temp 97.5 °F (36.4 °C)   Wt 78 kg (172 lb)   SpO2 97%